# Patient Record
Sex: MALE | Race: BLACK OR AFRICAN AMERICAN | NOT HISPANIC OR LATINO | Employment: FULL TIME | URBAN - METROPOLITAN AREA
[De-identification: names, ages, dates, MRNs, and addresses within clinical notes are randomized per-mention and may not be internally consistent; named-entity substitution may affect disease eponyms.]

---

## 2018-07-24 ENCOUNTER — DOCUMENTATION ONLY (OUTPATIENT)
Dept: TRANSPLANT | Facility: CLINIC | Age: 38
End: 2018-07-24

## 2018-07-24 NOTE — PROGRESS NOTES
Pt last seen by [facility] in 2016.  preht file closed.  He may be referred again if needed.

## 2018-07-26 NOTE — NURSING
Patient removed from VAD Potential membership list due to no follow up since 2016. VAD episode closed. Potential Folder Discarded and all signed paperwork scanned into EMR. Will initiate VAD education when or if indicated by provider team.

## 2018-12-14 ENCOUNTER — TELEPHONE (OUTPATIENT)
Dept: TRANSPLANT | Facility: CLINIC | Age: 38
End: 2018-12-14

## 2018-12-14 DIAGNOSIS — I50.9 CONGESTIVE HEART FAILURE, UNSPECIFIED HF CHRONICITY, UNSPECIFIED HEART FAILURE TYPE: Primary | ICD-10-CM

## 2018-12-14 NOTE — TELEPHONE ENCOUNTER
Called to schedule consult appointment. No answer after multiple rings. Called Dr Richards's office to request copy of echo. Office closed

## 2018-12-18 NOTE — TELEPHONE ENCOUNTER
REFERRAL NOTE:    Trell Landaverde has been referred to the pre-heart transplant office for consideration for orthotopic heart transplantation by Germán Richards. As per patients request to come in the second week after the first of the year, patient's appointments have been scheduled for 01/11/19. Information was provided and questions were answered. AHF/ Transplant Handout, appointment letter and campus map was mailed to the patient. My contact information was given. Pleasant. Call PRN. Left message on nurse voicemail at Dr Richards's office ([phone]) with appointment information along with my contact information.

## 2018-12-18 NOTE — TELEPHONE ENCOUNTER
Records received from referring provider scanned into [employer] and sent to be scanned into Epic.

## 2019-01-11 ENCOUNTER — INITIAL CONSULT (OUTPATIENT)
Dept: TRANSPLANT | Facility: CLINIC | Age: 39
End: 2019-01-11
Payer: MEDICARE

## 2019-01-11 ENCOUNTER — HOSPITAL ENCOUNTER (OUTPATIENT)
Dept: PULMONOLOGY | Facility: CLINIC | Age: 39
Discharge: HOME | End: 2019-01-11
Payer: MEDICARE

## 2019-01-11 ENCOUNTER — DOCUMENTATION ONLY (OUTPATIENT)
Dept: TRANSPLANT | Facility: CLINIC | Age: 39
End: 2019-01-11

## 2019-01-11 ENCOUNTER — HOSPITAL ENCOUNTER (OUTPATIENT)
Dept: CARDIOLOGY | Facility: CLINIC | Age: 39
Discharge: HOME | End: 2019-01-11
Attending: INTERNAL MEDICINE
Payer: MEDICARE

## 2019-01-11 VITALS
SYSTOLIC BLOOD PRESSURE: 112 MMHG | DIASTOLIC BLOOD PRESSURE: 64 MMHG | WEIGHT: 315 LBS | DIASTOLIC BLOOD PRESSURE: 59 MMHG | HEART RATE: 80 BPM | HEIGHT: 69 IN | WEIGHT: 315 LBS | HEART RATE: 79 BPM | HEIGHT: 69 IN | OXYGEN SATURATION: 96 % | BODY MASS INDEX: 46.65 KG/M2 | BODY MASS INDEX: 46.65 KG/M2 | SYSTOLIC BLOOD PRESSURE: 112 MMHG

## 2019-01-11 VITALS — WEIGHT: 315 LBS | BODY MASS INDEX: 46.65 KG/M2 | HEIGHT: 69 IN

## 2019-01-11 DIAGNOSIS — I42.0 DILATED CARDIOMYOPATHY: ICD-10-CM

## 2019-01-11 DIAGNOSIS — G47.33 OBSTRUCTIVE SLEEP APNEA SYNDROME: ICD-10-CM

## 2019-01-11 DIAGNOSIS — I50.9 CONGESTIVE HEART FAILURE, UNSPECIFIED HF CHRONICITY, UNSPECIFIED HEART FAILURE TYPE: ICD-10-CM

## 2019-01-11 DIAGNOSIS — E66.9 NON MORBID OBESITY: ICD-10-CM

## 2019-01-11 DIAGNOSIS — Z95.810 S/P ICD (INTERNAL CARDIAC DEFIBRILLATOR) PROCEDURE: ICD-10-CM

## 2019-01-11 DIAGNOSIS — I10 ESSENTIAL HYPERTENSION: ICD-10-CM

## 2019-01-11 DIAGNOSIS — I50.40 COMBINED SYSTOLIC AND DIASTOLIC CONGESTIVE HEART FAILURE, NYHA CLASS 3, UNSPECIFIED CONGESTIVE HEART FAILURE CHRONICITY: Primary | ICD-10-CM

## 2019-01-11 LAB
ASCENDING AORTA: 2.92 CM
AV INDEX (PROSTH): 0.57
AV MEAN GRADIENT: 3.02 MMHG
AV PEAK GRADIENT: 5.02 MMHG
AV VALVE AREA: 2.11 CM2
BSA FOR ECHO PROCEDURE: 2.87 M2
CV ECHO LV RWT: 0.2 CM
DOP CALC AO PEAK VEL: 1.12 M/S
DOP CALC AO VTI: 19.84 CM
DOP CALC LVOT AREA: 3.7 CM2
DOP CALC LVOT DIAMETER: 2.17 CM
DOP CALC LVOT STROKE VOLUME: 41.88 CM3
DOP CALCLVOT PEAK VEL VTI: 11.33 CM
E WAVE DECELERATION TIME: 249.91 MSEC
E/A RATIO: 0.51
E/E' RATIO: 6.57
ECHO LV POSTERIOR WALL: 0.68 CM (ref 0.6–1.1)
FRACTIONAL SHORTENING: 7 % (ref 28–44)
INTERVENTRICULAR SEPTUM: 0.75 CM (ref 0.6–1.1)
IVRT: 0.12 MSEC
LA MAJOR: 6.12 CM
LA MINOR: 6.05 CM
LA WIDTH: 4.95 CM
LEFT ATRIUM SIZE: 4.86 CM
LEFT ATRIUM VOLUME INDEX: 46.3 ML/M2
LEFT ATRIUM VOLUME: 124.42 CM3
LEFT INTERNAL DIMENSION IN SYSTOLE: 6.36 CM (ref 2.1–4)
LEFT VENTRICLE DIASTOLIC VOLUME INDEX: 90.56 ML/M2
LEFT VENTRICLE DIASTOLIC VOLUME: 243.51 ML
LEFT VENTRICLE MASS INDEX: 76.4 G/M2
LEFT VENTRICLE SYSTOLIC VOLUME INDEX: 76.6 ML/M2
LEFT VENTRICLE SYSTOLIC VOLUME: 205.9 ML
LEFT VENTRICULAR INTERNAL DIMENSION IN DIASTOLE: 6.85 CM (ref 3.5–6)
LEFT VENTRICULAR MASS: 205.48 G
LV LATERAL E/E' RATIO: 5.75
LV SEPTAL E/E' RATIO: 7.67
MV PEAK A VEL: 0.91 M/S
MV PEAK E VEL: 0.46 M/S
PISA TR MAX VEL: 2.71 M/S
PULM VEIN S/D RATIO: 0.7
PV PEAK D VEL: 0.57 M/S
PV PEAK S VEL: 0.4 M/S
PV PEAK VELOCITY: 0.85 CM/S
RA MAJOR: 5.42 CM
RA PRESSURE: 3 MMHG
RA WIDTH: 4.95 CM
RIGHT VENTRICULAR END-DIASTOLIC DIMENSION: 3.25 CM
RV TISSUE DOPPLER FREE WALL SYSTOLIC VELOCITY 1 (APICAL 4 CHAMBER VIEW): 11.49 M/S
SINUS: 3.16 CM
STJ: 2.92 CM
TDI LATERAL: 0.08
TDI SEPTAL: 0.06
TDI: 0.07
TR MAX PG: 29.38 MMHG
TRICUSPID ANNULAR PLANE SYSTOLIC EXCURSION: 1.69 CM
TV REST PULMONARY ARTERY PRESSURE: 32 MMHG

## 2019-01-11 PROCEDURE — 99999 PR PBB SHADOW E&M-EST. PATIENT-LVL IV: ICD-10-PCS | Mod: PBBFAC,TXP,, | Performed by: INTERNAL MEDICINE

## 2019-01-11 PROCEDURE — 94618 PULMONARY STRESS TESTING: ICD-10-PCS | Mod: NTX,S$GLB,, | Performed by: INTERNAL MEDICINE

## 2019-01-11 PROCEDURE — 3074F PR MOST RECENT SYSTOLIC BLOOD PRESSURE < 130 MM HG: ICD-10-PCS | Mod: CPTII,S$GLB,TXP, | Performed by: INTERNAL MEDICINE

## 2019-01-11 PROCEDURE — 3008F PR BODY MASS INDEX (BMI) DOCUMENTED: ICD-10-PCS | Mod: CPTII,S$GLB,TXP, | Performed by: INTERNAL MEDICINE

## 2019-01-11 PROCEDURE — 3078F DIAST BP <80 MM HG: CPT | Mod: CPTII,S$GLB,TXP, | Performed by: INTERNAL MEDICINE

## 2019-01-11 PROCEDURE — 94618 PULMONARY STRESS TESTING: CPT | Mod: NTX,S$GLB,, | Performed by: INTERNAL MEDICINE

## 2019-01-11 PROCEDURE — 99999 PR PBB SHADOW E&M-EST. PATIENT-LVL IV: CPT | Mod: PBBFAC,TXP,, | Performed by: INTERNAL MEDICINE

## 2019-01-11 PROCEDURE — 93306 TTE W/DOPPLER COMPLETE: CPT | Mod: NTX,S$GLB,, | Performed by: INTERNAL MEDICINE

## 2019-01-11 PROCEDURE — 3008F BODY MASS INDEX DOCD: CPT | Mod: CPTII,S$GLB,TXP, | Performed by: INTERNAL MEDICINE

## 2019-01-11 PROCEDURE — 93306 TRANSTHORACIC ECHO (TTE) COMPLETE (CUPID ONLY): ICD-10-PCS | Mod: NTX,S$GLB,, | Performed by: INTERNAL MEDICINE

## 2019-01-11 PROCEDURE — 3078F PR MOST RECENT DIASTOLIC BLOOD PRESSURE < 80 MM HG: ICD-10-PCS | Mod: CPTII,S$GLB,TXP, | Performed by: INTERNAL MEDICINE

## 2019-01-11 PROCEDURE — 99215 PR OFFICE/OUTPT VISIT, EST, LEVL V, 40-54 MIN: ICD-10-PCS | Mod: S$GLB,TXP,, | Performed by: INTERNAL MEDICINE

## 2019-01-11 PROCEDURE — 3074F SYST BP LT 130 MM HG: CPT | Mod: CPTII,S$GLB,TXP, | Performed by: INTERNAL MEDICINE

## 2019-01-11 PROCEDURE — 99215 OFFICE O/P EST HI 40 MIN: CPT | Mod: S$GLB,TXP,, | Performed by: INTERNAL MEDICINE

## 2019-01-11 RX ORDER — SPIRONOLACTONE 25 MG/1
25 TABLET ORAL DAILY
COMMUNITY
End: 2021-02-08

## 2019-01-11 RX ORDER — ZOLPIDEM TARTRATE 10 MG/1
10 TABLET ORAL NIGHTLY PRN
COMMUNITY
Start: 2018-12-11

## 2019-01-11 RX ORDER — TAMSULOSIN HYDROCHLORIDE 0.4 MG/1
0.4 CAPSULE ORAL
COMMUNITY
Start: 2018-10-17 | End: 2022-06-24 | Stop reason: SDUPTHER

## 2019-01-11 RX ORDER — AMIODARONE HYDROCHLORIDE 200 MG/1
TABLET ORAL
COMMUNITY
Start: 2017-08-21 | End: 2019-09-11 | Stop reason: ALTCHOICE

## 2019-01-11 NOTE — PROGRESS NOTES
Chart reviewed.  Pt will not undergo full eval for AHF options at this time.  He is scheduled for CPX on 4/11/19.  Request placed for provider f/u on same day following testing.

## 2019-01-11 NOTE — NURSING
Patient identified by 2 identifiers. Denies previous reactions to blood transfusions and allergies reviewed.  Procedure explained & consent obtained.  22 g IV placed to Rt Hand, flushed w/ 10cc NS pre & post contrast administration.  Pt was a challenging IV stick, pt stuck 4 times by 2 nurses.  3cc Optison administered, echo images obtained.  Pt tolerated procedure well.  IV D/C'ed, preasure dsg applied.  Pt D/C'ed to home.

## 2019-01-11 NOTE — LETTER
January 11, 2019        Germán Richards  [address]  [place] [address]  Phone: [phone]  Fax: [phone]             [facility] [address] [place] [address]  Phone: [phone]   Patient: Trell Landaverde   MR Number: [id number]   YOB: 1980   Date of Visit: 1/11/2019       Dear Dr. Germán Richards    Thank you for referring Trell Landaverde to me for evaluation. Attached you will find relevant portions of my assessment and plan of care.    If you have questions, please do not hesitate to call me. I look forward to following Trell Landaverde along with you.    Sincerely,    Israel Mariee MD    Enclosure    If you would like to receive this communication electronically, please contact [email or web address] or [phone] to request [employer] Link access.    [employer] Link is a tool which provides read-only access to select patient information with whom you have a relationship. Its easy to use and provides real time access to review your patients record including encounter summaries, notes, results, and demographic information.    If you feel you have received this communication in error or would no longer like to receive these types of communications, please e-mail [email or web address]

## 2019-01-12 NOTE — PROCEDURES
Trell Landaverde is a 38 y.o.  male patient, who presents for a 6 minute walk test ordered by Israel Mariee MD.  The diagnosis is Pre Heart Transplant; Congestive Heart Failure.  The patient's BMI is 55 kg/m2.  Predicted distance (lower limit of normal) is 421.67 meters.      Test Results:    The test was completed without stopping.  The total time walked was 360 seconds.  During walking, the patient reported:  No complaints.  The patient used no assistive devices during testing.     01/11/2019---------Distance: 365.76 meters (1200 feet)     O2 Sat % Supplemental Oxygen Heart Rate Blood Pressure Elayne Scale   Pre-exercise  (Resting) 97 % Room Air 76 bpm 144/73 mmHg 0   During Exercise 93 % Room Air 104 bpm 142/78 mmHg 1   Post-exercise  (Recovery) 95 % Room Air  97 bpm       Recovery Time:  52 seconds    Performing nurse/tech:  Eun SHIRLEY      PREVIOUS STUDY:   The patient has not had a previous study.      CLINICAL INTERPRETATION:  Six minute walk distance is 365.76 meters (1200 feet) with very light dyspnea.  During exercise, there was desaturation while breathing room air.  Blood pressure remained stable and Heart rate increased significantly with walking.  The patient did not report non-pulmonary symptoms during exercise.  No previous study performed.  Based upon age and body mass index, exercise capacity is less than predicted.

## 2019-04-10 ENCOUNTER — TELEPHONE (OUTPATIENT)
Dept: TRANSPLANT | Facility: CLINIC | Age: 39
End: 2019-04-10

## 2019-04-10 DIAGNOSIS — I50.40 COMBINED SYSTOLIC AND DIASTOLIC CONGESTIVE HEART FAILURE, NYHA CLASS 3, UNSPECIFIED CONGESTIVE HEART FAILURE CHRONICITY: Primary | ICD-10-CM

## 2019-04-10 NOTE — TELEPHONE ENCOUNTER
Spoke with pt to confirm appointments for tomorrow (04/11/2019). Informed pt that labs had been added to appointment schedule for 7:50. Instructed pt to report to 2nd floor clinic tower for labs and that CPX check in would be in that same area. Pt verbalized understanding.

## 2019-04-11 ENCOUNTER — HOSPITAL ENCOUNTER (OUTPATIENT)
Dept: CARDIOLOGY | Facility: CLINIC | Age: 39
Discharge: HOME | End: 2019-04-11
Attending: INTERNAL MEDICINE
Payer: MEDICARE

## 2019-04-11 ENCOUNTER — OFFICE VISIT (OUTPATIENT)
Dept: TRANSPLANT | Facility: CLINIC | Age: 39
End: 2019-04-11
Payer: MEDICARE

## 2019-04-11 VITALS
WEIGHT: 315 LBS | SYSTOLIC BLOOD PRESSURE: 130 MMHG | WEIGHT: 315 LBS | DIASTOLIC BLOOD PRESSURE: 89 MMHG | HEART RATE: 71 BPM | SYSTOLIC BLOOD PRESSURE: 125 MMHG | HEIGHT: 69 IN | BODY MASS INDEX: 46.65 KG/M2 | DIASTOLIC BLOOD PRESSURE: 58 MMHG | BODY MASS INDEX: 46.65 KG/M2 | HEIGHT: 69 IN | HEART RATE: 83 BPM

## 2019-04-11 DIAGNOSIS — G47.33 OBSTRUCTIVE SLEEP APNEA SYNDROME: ICD-10-CM

## 2019-04-11 DIAGNOSIS — I10 ESSENTIAL HYPERTENSION: ICD-10-CM

## 2019-04-11 DIAGNOSIS — Z95.810 S/P ICD (INTERNAL CARDIAC DEFIBRILLATOR) PROCEDURE: ICD-10-CM

## 2019-04-11 DIAGNOSIS — I50.22 CHRONIC SYSTOLIC CONGESTIVE HEART FAILURE: Primary | ICD-10-CM

## 2019-04-11 DIAGNOSIS — E66.01 CLASS 3 SEVERE OBESITY WITHOUT SERIOUS COMORBIDITY WITH BODY MASS INDEX (BMI) OF 60.0 TO 69.9 IN ADULT, UNSPECIFIED OBESITY TYPE: ICD-10-CM

## 2019-04-11 DIAGNOSIS — I42.0 DILATED CARDIOMYOPATHY: ICD-10-CM

## 2019-04-11 DIAGNOSIS — I50.40 COMBINED SYSTOLIC AND DIASTOLIC CONGESTIVE HEART FAILURE, NYHA CLASS 3, UNSPECIFIED CONGESTIVE HEART FAILURE CHRONICITY: ICD-10-CM

## 2019-04-11 LAB
CV STRESS BASE HR: 71 BPM
DIASTOLIC BLOOD PRESSURE: 89 MMHG
OHS CV CPX 1 MINUTE RECOVERY HEART RATE: 122 BPM
OHS CV CPX 85 PERCENT MAX PREDICTED HEART RATE MALE: 156
OHS CV CPX ANAEROBIC THRESHOLD DIASTOLIC BLOOD PRESSURE: 75 MMHG
OHS CV CPX ANAEROBIC THRESHOLD HEART RATE: 106
OHS CV CPX ANAEROBIC THRESHOLD RATE PRESSURE PRODUCT: NORMAL
OHS CV CPX ANAEROBIC THRESHOLD SYSTOLIC BLOOD PRESSURE: 145
OHS CV CPX DATA GRADE - AT: 2.1
OHS CV CPX DATA GRADE - PEAK: 4.7
OHS CV CPX DATA O2 SAT - PEAK: 92
OHS CV CPX DATA O2 SAT - REST: 95
OHS CV CPX DATA SPEED - AT: 2.1
OHS CV CPX DATA SPEED - PEAK: 2.8
OHS CV CPX DATA TIME - AT: 4.63
OHS CV CPX DATA TIME - PEAK: 7.21
OHS CV CPX DATA VE/VCO2 - AT: 27
OHS CV CPX DATA VE/VCO2 - PEAK: 30
OHS CV CPX DATA VE/VO2 - AT: 22
OHS CV CPX DATA VE/VO2 - PEAK: 33
OHS CV CPX DATA VO2 - AT: 10.1
OHS CV CPX DATA VO2 - PEAK: 13.5
OHS CV CPX DATA VO2 - REST: 2.8
OHS CV CPX FEV1/FVC: 0.81
OHS CV CPX FORCED EXPIRATORY VOLUME: 2.83
OHS CV CPX FORCED VITAL CAPACITY (FVC): 3.5
OHS CV CPX HIGHEST VO: 22
OHS CV CPX MAX PREDICTED HEART RATE: 183
OHS CV CPX MAXIMAL VOLUNTARY VENTILATION (MVV) PREDICTED: 113.2
OHS CV CPX MAXIMAL VOLUNTARY VENTILATION (MVV): 75
OHS CV CPX MAXIUMUM EXERCISE VENTILATION (VE MAX): 75.4
OHS CV CPX PATIENT AGE: 37
OHS CV CPX PATIENT HEIGHT IN: 69
OHS CV CPX PATIENT IS FEMALE AGE 11-19: 0
OHS CV CPX PATIENT IS FEMALE AGE GREATER THAN 19: 0
OHS CV CPX PATIENT IS FEMALE AGE LESS THAN 11: 0
OHS CV CPX PATIENT IS FEMALE: 0
OHS CV CPX PATIENT IS MALE AGE 11-25: 0
OHS CV CPX PATIENT IS MALE AGE GREATER THAN 25: 1
OHS CV CPX PATIENT IS MALE AGE LESS THAN 11: 0
OHS CV CPX PATIENT IS MALE GREATER THAN 18: 1
OHS CV CPX PATIENT IS MALE LESS THAN OR EQUAL TO 18: 0
OHS CV CPX PATIENT IS MALE: 1
OHS CV CPX PATIENT WEIGHT RETURNED IN OZ: 6048
OHS CV CPX PEAK DIASTOLIC BLOOD PRESSURE: 99 MMHG
OHS CV CPX PEAK HEAR RATE: 126 BPM
OHS CV CPX PEAK RATE PRESSURE PRODUCT: NORMAL
OHS CV CPX PEAK SYSTOLIC BLOOD PRESSURE: 165 MMHG
OHS CV CPX PERCENT BODY FAT: 34.8
OHS CV CPX PERCENT MAX PREDICTED HEART RATE ACHIEVED: 69
OHS CV CPX PREDICTED VO2: 22 ML/KG/MIN
OHS CV CPX RATE PRESSURE PRODUCT PRESENTING: 9230
OHS CV CPX REST PET CO2: 38
OHS CV CPX VE/VCO2 SLOPE: 24.9
STRESS ECHO POST EXERCISE DUR MIN: 7 MIN
STRESS ECHO POST EXERCISE DUR SEC: 13
SYSTOLIC BLOOD PRESSURE: 130 MMHG

## 2019-04-11 PROCEDURE — 3074F SYST BP LT 130 MM HG: CPT | Mod: CPTII,NTX,S$GLB, | Performed by: INTERNAL MEDICINE

## 2019-04-11 PROCEDURE — 99999 PR PBB SHADOW E&M-EST. PATIENT-LVL III: CPT | Mod: PBBFAC,TXP,, | Performed by: INTERNAL MEDICINE

## 2019-04-11 PROCEDURE — 94621 CARDIOPULM EXERCISE TESTING: CPT | Mod: NTX,S$GLB,, | Performed by: INTERNAL MEDICINE

## 2019-04-11 PROCEDURE — 99215 PR OFFICE/OUTPT VISIT, EST, LEVL V, 40-54 MIN: ICD-10-PCS | Mod: NTX,S$GLB,, | Performed by: INTERNAL MEDICINE

## 2019-04-11 PROCEDURE — 94621 CARDIOPULMONARY EXERCISE TESTING (CUPID ONLY): ICD-10-PCS | Mod: NTX,S$GLB,, | Performed by: INTERNAL MEDICINE

## 2019-04-11 PROCEDURE — 99215 OFFICE O/P EST HI 40 MIN: CPT | Mod: NTX,S$GLB,, | Performed by: INTERNAL MEDICINE

## 2019-04-11 PROCEDURE — 3074F PR MOST RECENT SYSTOLIC BLOOD PRESSURE < 130 MM HG: ICD-10-PCS | Mod: CPTII,NTX,S$GLB, | Performed by: INTERNAL MEDICINE

## 2019-04-11 PROCEDURE — 3078F DIAST BP <80 MM HG: CPT | Mod: CPTII,NTX,S$GLB, | Performed by: INTERNAL MEDICINE

## 2019-04-11 PROCEDURE — 3078F PR MOST RECENT DIASTOLIC BLOOD PRESSURE < 80 MM HG: ICD-10-PCS | Mod: CPTII,NTX,S$GLB, | Performed by: INTERNAL MEDICINE

## 2019-04-11 PROCEDURE — 99999 PR PBB SHADOW E&M-EST. PATIENT-LVL III: ICD-10-PCS | Mod: PBBFAC,TXP,, | Performed by: INTERNAL MEDICINE

## 2019-04-11 PROCEDURE — 3008F PR BODY MASS INDEX (BMI) DOCUMENTED: ICD-10-PCS | Mod: CPTII,NTX,S$GLB, | Performed by: INTERNAL MEDICINE

## 2019-04-11 PROCEDURE — 3008F BODY MASS INDEX DOCD: CPT | Mod: CPTII,NTX,S$GLB, | Performed by: INTERNAL MEDICINE

## 2019-04-11 NOTE — LETTER
April 11, 2019        Germán Richards  [address]  [place] [address]  Phone: [phone]  Fax: [phone]             [facility] [address] [place] [address]  Phone: [phone]   Patient: Trell Landaverde   MR Number: [id number]   YOB: 1980   Date of Visit: 4/11/2019       Dear Dr. Germán Richards    Thank you for referring Trell Landaverde to me for evaluation. Attached you will find relevant portions of my assessment and plan of care.    If you have questions, please do not hesitate to call me. I look forward to following Trell Landaverde along with you.    Sincerely,    Israel Mariee MD    Enclosure    If you would like to receive this communication electronically, please contact [email or web address] or [phone] to request [employer] Link access.    [employer] Link is a tool which provides read-only access to select patient information with whom you have a relationship. Its easy to use and provides real time access to review your patients record including encounter summaries, notes, results, and demographic information.    If you feel you have received this communication in error or would no longer like to receive these types of communications, please e-mail [email or web address]

## 2019-05-08 ENCOUNTER — HOSPITAL ENCOUNTER (OUTPATIENT)
Dept: RADIOLOGY | Facility: HOSPITAL | Age: 39
Discharge: HOME | End: 2019-05-08
Attending: PHYSICIAN ASSISTANT
Payer: MEDICARE

## 2019-05-08 ENCOUNTER — INITIAL CONSULT (OUTPATIENT)
Dept: BARIATRICS | Facility: CLINIC | Age: 39
End: 2019-05-08
Payer: MEDICARE

## 2019-05-08 VITALS — BODY MASS INDEX: 46.65 KG/M2 | WEIGHT: 315 LBS | HEIGHT: 69 IN

## 2019-05-08 DIAGNOSIS — I10 BENIGN ESSENTIAL HYPERTENSION: ICD-10-CM

## 2019-05-08 DIAGNOSIS — Z95.810 S/P ICD (INTERNAL CARDIAC DEFIBRILLATOR) PROCEDURE: ICD-10-CM

## 2019-05-08 DIAGNOSIS — I42.0 NONISCHEMIC DILATED CARDIOMYOPATHY: ICD-10-CM

## 2019-05-08 DIAGNOSIS — D51.9 ANEMIA DUE TO VITAMIN B12 DEFICIENCY, UNSPECIFIED B12 DEFICIENCY TYPE: ICD-10-CM

## 2019-05-08 DIAGNOSIS — G47.33 OSA (OBSTRUCTIVE SLEEP APNEA): ICD-10-CM

## 2019-05-08 DIAGNOSIS — I50.40 COMBINED SYSTOLIC AND DIASTOLIC CONGESTIVE HEART FAILURE, NYHA CLASS 3, UNSPECIFIED CONGESTIVE HEART FAILURE CHRONICITY: ICD-10-CM

## 2019-05-08 DIAGNOSIS — D52.9 ANEMIA DUE TO FOLIC ACID DEFICIENCY, UNSPECIFIED DEFICIENCY TYPE: ICD-10-CM

## 2019-05-08 DIAGNOSIS — Z95.810 IMPLANTABLE CARDIOVERTER-DEFIBRILLATOR (ICD) IN SITU: ICD-10-CM

## 2019-05-08 DIAGNOSIS — R73.01 IMPAIRED FASTING GLUCOSE: ICD-10-CM

## 2019-05-08 DIAGNOSIS — F32.A DEPRESSION, UNSPECIFIED DEPRESSION TYPE: ICD-10-CM

## 2019-05-08 DIAGNOSIS — I50.22 CHRONIC SYSTOLIC CONGESTIVE HEART FAILURE: ICD-10-CM

## 2019-05-08 PROCEDURE — 71046 XR CHEST PA AND LATERAL: ICD-10-PCS | Mod: 26,NTX,, | Performed by: RADIOLOGY

## 2019-05-08 PROCEDURE — 3008F BODY MASS INDEX DOCD: CPT | Mod: CPTII,S$GLB,TXP, | Performed by: PHYSICIAN ASSISTANT

## 2019-05-08 PROCEDURE — 3008F PR BODY MASS INDEX (BMI) DOCUMENTED: ICD-10-PCS | Mod: CPTII,S$GLB,TXP, | Performed by: PHYSICIAN ASSISTANT

## 2019-05-08 PROCEDURE — 71046 X-RAY EXAM CHEST 2 VIEWS: CPT | Mod: TC,NTX

## 2019-05-08 PROCEDURE — 99999 PR PBB SHADOW E&M-EST. PATIENT-LVL III: CPT | Mod: PBBFAC,TXP,, | Performed by: PHYSICIAN ASSISTANT

## 2019-05-08 PROCEDURE — 71046 X-RAY EXAM CHEST 2 VIEWS: CPT | Mod: 26,NTX,, | Performed by: RADIOLOGY

## 2019-05-08 PROCEDURE — 99205 PR OFFICE/OUTPT VISIT, NEW, LEVL V, 60-74 MIN: ICD-10-PCS | Mod: S$GLB,TXP,, | Performed by: PHYSICIAN ASSISTANT

## 2019-05-08 PROCEDURE — 99205 OFFICE O/P NEW HI 60 MIN: CPT | Mod: S$GLB,TXP,, | Performed by: PHYSICIAN ASSISTANT

## 2019-05-08 PROCEDURE — 99999 PR PBB SHADOW E&M-EST. PATIENT-LVL III: ICD-10-PCS | Mod: PBBFAC,TXP,, | Performed by: PHYSICIAN ASSISTANT

## 2019-05-08 NOTE — PATIENT INSTRUCTIONS
Prior to surgery you will need to complete:  - Dietitian consult and follow up appointments as needed  - Heart failure clearance  - Labs  - Chest X-ray  - EKG  - Psychological evaluation, Please call psychiatry [phone] to schedule      In preparation for bariatric surgery, please complete the following:   · Discuss your current medications with your primary care provider, remember your medications will need to be crushed, chewable, or in liquid form for the first 3-6 months after a gastric bypass or sleeve.  For a gastric band, your medications will need to be crushed indefinitely.    · If you take a blood thinner such as: Coumadin (warfarin), Pradaxa (dabigatran), or Plavix (clopidogrel), you will need to speak with your prescribing provider on how or if this medication can be stopped before surgery.   · If you take a medication for depression or anxiety, you will need to begin crushing or opening the capsule 1-3 months prior to surgery.  Remember to discuss this with the psychologist or psychiatrist that you see.   · If you take medication for arthritis on a daily basis that is considered a non-steroidal anti-inflammatory (NSAID), please discuss with your prescribing physician an alternative medication.  After having gastric bypass or gastric sleeve, this group of medications is not appropriate to take due to increased risk of bleeding stomach ulcers.      DEFINITIONS  Appointments: Pre-scheduled meetings or consultations with any physician, advanced practice provider, dietitian, or psychologist, and labs, imaging studies, sleep studies, etc.   Late cancellation: Cancelling an appointment 24-48 hours prior to scheduled time.  No-Show appointment:  is when    You do NOT arrive to your appointment at the time its scheduled.   You call to cancel or cancel via [facility] less than 24 hours in advance of your scheduled appointment   You show up 15 minutes AFTER your scheduled appointment time without any  notification of being late.     POLICY  1. You are allowed up to 3 cancellations for appointments.    After 3 cancellations your case will be placed on hold for 2 months and after that time you can resume the program.   2. You are allowed only 1 no-show for an appointment.    You will be re-scheduled one time and if there is a 2nd no-show at any point, your case will be placed on hold for 3 months.  After 3 months you can resume the program.     3. Upon resuming the program after being placed on hold for either above mentioned reasons, if you have a late cancel or no show for any appointment, the bariatric team will review if youre an appropriate candidate for surgery at the monthly meeting.

## 2019-05-08 NOTE — PROGRESS NOTES
BARIATRIC NEW PATIENT EVALUATION    CHIEF COMPLAINT:   Morbid Obesity Body mass index is 55.12 kg/m². and inability to lose wieght and maintain weight loss .    HISTORY OF PRESENT ILLNESS:  Trell Landaverde  is a 38 y.o. male presenting for morbid obesity Body mass index is 55.12 kg/m². and inability to lose weight and maintain weight loss . Pt states he has not always been overweight, states around the age of 26 he started to gain weight. In the past twelve years has gained over 100 lbs about 150. The patient has tried exercising 3x's, and Weight Watchers, vegan diet . Highest weight 383. 250-260.    Echo January 2019     · Moderate left ventricular enlargement.  · Severely decreased left ventricular systolic function. The estimated ejection fraction is 20%  · Severe global hypokinetic wall motion.  · Grade I (mild) left ventricular diastolic dysfunction consistent with impaired relaxation.  · Moderate left atrial enlargement.  · Low normal right ventricular systolic function.  · Mild right atrial enlargement.  · Mild mitral regurgitation.  · Mild tricuspid regurgitation.  · Normal central venous pressure (3 mm Hg).  · The estimated PA systolic pressure is 32 mm Hg        2D echo with CFD  - Eccentric hypertrophy.   - Severely depressed left ventricular systolic function (EF 15-20%).   - Moderate left atrial enlargement.   - Biatrial enlargement.   - Normal right ventricular systolic function .   - The estimated PA systolic pressure is greater than 23 mmHg.   - Mild tricuspid regurgitation.   - Atrial septal aneurysm .      [facility]  AOPRES: 132/90 (104)  AOSAT: 97  FICKCI: 2.25  FICKCO: 5.62  PAPRES: 45/24 (31)  PASAT: 69  PVR: 2.31  PWPRES: 25/18 (18)  RAPRES: 10/8 (7)  RVPRES: 44/7, 7  SVR: 17.26  PWSAT: 95     CPX   - The respiratory exchange ratio (RER) was 1.13, suggesting an adequate effort.  - The breathing reserve is calculated at 33%, which is normal. Oxygen saturation with exercise remained normal.   - The  PkVO2 was 19.2 ml/kg/min which is 47% of predicted equating to a functional capacity of 5.5 METS indicating severe functional impairment.   - The anaerobic threshold (AT), which occurred at a heart rate of 125bpm, was 12.8 ml/kg/min, which is 31% of the predicted VO2 and is reduced.   - The PkVO2 Lean was 27.24 ml/kg of lean body weight/min indicating a good prognosis in heart failure.   - The VE/VCO2 decreased by -31% from rest to AT. The VE/VCO2 Brantley was 22.1.  The Resting PetCO2 was 43.7.            PAST MEDICAL HISTORY:  Past Medical History:   Diagnosis Date    Allergy     Cardiomyopathy     CHF (congestive heart failure)     Depression     Hypertension     Obesity          PAST SURGICAL HISTORY:  Past Surgical History:   Procedure Laterality Date    defibrillator      FRACTURE SURGERY Left     ankle    VASECTOMY         FAMILY HISTORY:  Family History   Problem Relation Age of Onset    Hypertension Mother     Hypertension Brother        SOCIAL HISTORY:  Social History     Socioeconomic History    Marital status:      Spouse name: Not on file    Number of children: Not on file    Years of education: Not on file    Highest education level: Not on file   Occupational History    Not on file   Social Needs    Financial resource strain: Not on file    Food insecurity:     Worry: Not on file     Inability: Not on file    Transportation needs:     Medical: Not on file     Non-medical: Not on file   Tobacco Use    Smoking status: Never Smoker    Smokeless tobacco: Never Used   Substance and Sexual Activity    Alcohol use: Yes    Drug use: No    Sexual activity: Yes     Partners: Female     Birth control/protection: Partner-Vasectomy   Lifestyle    Physical activity:     Days per week: Not on file     Minutes per session: Not on file    Stress: Not on file   Relationships    Social connections:     Talks on phone: Not on file     Gets together: Not on file     Attends Confucianist  service: Not on file     Active member of club or organization: Not on file     Attends meetings of clubs or organizations: Not on file     Relationship status: Not on file   Other Topics Concern    Not on file   Social History Narrative    Not on file       MEDICATIONS:  Current Outpatient Medications   Medication Sig Dispense Refill    amiodarone (PACERONE) 200 MG Tab TAKE ONE TABLET BY MOUTH ONCE DAILY      aspirin (ECOTRIN) 81 MG EC tablet Take 81 mg by mouth once daily.      carvedilol (COREG) 25 MG tablet Take 1 tablet (25 mg total) by mouth 2 (two) times daily. (Patient taking differently: Take 25 mg by mouth 2 (two) times daily. Take a tablet and a 1/2 twice a day) 90 tablet 12    furosemide (LASIX) 40 MG tablet Take by mouth 2 (two) times daily. Take a tablet and 1/2 twice a day      sacubitril-valsartan (ENTRESTO) 49-51 mg Tab Take 1 tablet by mouth 2 (two) times daily.       spironolactone (ALDACTONE) 25 MG tablet Take 25 mg by mouth once daily.       tamsulosin (FLOMAX) 0.4 mg Cap Take 0.4 mg by mouth once daily.       zolpidem (AMBIEN) 10 mg Tab Take 10 mg by mouth nightly as needed.       albuterol (ACCUNEB) 0.63 mg/3 mL Nebu Take 0.63 mg by nebulization every 6 (six) hours as needed.      fluticasone-salmeterol 100-50 mcg/dose (ADVAIR) 100-50 mcg/dose diskus inhaler Inhale 1 puff into the lungs as needed.        No current facility-administered medications for this visit.        ALLERGIES:  Review of patient's allergies indicates:   Allergen Reactions    Iodine Swelling    Latex Itching    Orange Swelling    Orange juice Swelling    Orange oil Swelling    Shellfish containing products Swelling       ROS:  Review of Systems   Constitutional: Negative for chills and fever.   HENT: Negative for tinnitus.    Eyes: Negative for blurred vision and double vision.   Respiratory: Positive for shortness of breath (intermittent depending on activity ). Negative for cough.    Cardiovascular:  "Negative for chest pain, palpitations, orthopnea and leg swelling.   Gastrointestinal: Negative for abdominal pain, constipation, diarrhea, heartburn, nausea and vomiting.   Genitourinary: Negative for dysuria and hematuria.   Musculoskeletal: Negative for back pain, joint pain, myalgias and neck pain.   Skin: Negative for rash.   Neurological: Negative for dizziness, tingling and headaches.   Psychiatric/Behavioral: Negative for depression and suicidal ideas. The patient is not nervous/anxious.        PE:  Vitals:    05/08/19 1459   Weight: (!) 169.3 kg (373 lb 3.8 oz)   Height: 5' 9" (1.753 m)       Physical Exam   Constitutional: He is oriented to person, place, and time. He appears well-developed and well-nourished.   HENT:   Head: Normocephalic and atraumatic.   Eyes: Pupils are equal, round, and reactive to light. Conjunctivae and EOM are normal.   Neck: Normal range of motion. Neck supple. No JVD present. No thyromegaly present.   Cardiovascular: Normal rate, regular rhythm, normal heart sounds and intact distal pulses.   No murmur heard.  Pulmonary/Chest: Effort normal and breath sounds normal. He has no wheezes.   Abdominal: Soft. Bowel sounds are normal. He exhibits no distension. There is no tenderness. There is no guarding.   Musculoskeletal: Normal range of motion. He exhibits edema (BLE trace ). He exhibits no tenderness.   Neurological: He is alert and oriented to person, place, and time. Coordination normal.   Skin: Skin is warm and dry. Capillary refill takes less than 2 seconds. No erythema. No pallor.   Psychiatric: He has a normal mood and affect. His behavior is normal. Judgment and thought content normal.        DIAGNOSIS:  1. Morbid Obesity with Body mass index is 55.12 kg/m². and inability to lose weight and maintain weight loss.  2. Co-morbidities: CHF, ORIN, HTN, Depression     PLAN:  The patient is Potential candidate for Bariatric Surgery. he is interested in sleeve gastrectomy with Dr." Roopa. The surgery and post-op care were discussed in detail with the patient. All questions were answered.    he understands that bariatric surgery is a tool to aid in weight loss and that he needs to be committed to the diet and exercise post-operatively for successful weight loss.  Discussed expected weight loss outcomes after surgery which is 50% of the excess weight on his frame.  Goal weight is 260#s.  Discussed with patient that bariatric surgery is not the easy way out and that it will take plenty of dedication on the patient's part to be successful. Also discussed the possibility of weight regain if the patient strays from the diet guidelines or exercise requirements. Patient verbalized understanding and wishes to proceed with the work-up.    ORDERS:  1. Chest X-Ray and EKG  2. Psychological Consult, Bariatric Dietician Consult and Heart transplant clearance  3. Bariatric Labs: BMP, CBC, Folate Serum, H. pylori, HgA1C, Hepatic Panel/LFT, Iron & TIBC, Lipid Profile, Magnesium, Phosphate, T3, T4, TSH, Free T4, Vitamin B12, and Vitamin B1.      Primary Physician: Germán Richards MD  RTC: As scheduled.    60 minute visit, over 50% of time spent counseling patient face to face on surgical options, risks, benefits, expected diet, recommended exercise regimen, and expected weight loss.

## 2019-05-08 NOTE — LETTER
May 8, 2019      Israel Mariee MD  [address]  [facility] [address]           [facility] - B[facility]ric [facility]  [address]  [facility] [address]  Phone: [phone]  Fax: [phone]          Patient: Trell Landaverde   MR Number: [id number]   YOB: 1980   Date of Visit: 5/8/2019       Dear Dr. Israel Mariee:    Thank you for referring Trell Landaverde to me for evaluation. Attached you will find relevant portions of my assessment and plan of care.    If you have questions, please do not hesitate to call me. I look forward to following Trell Landaverde along with you.    Sincerely,    Angy Canales PA-C    Enclosure  CC:  No Recipients    If you would like to receive this communication electronically, please contact [email or web address] or [phone] to request more information on [employer] Link access.    For providers and/or their staff who would like to refer a patient to [facility], please contact us through our one-stop-shop provider referral line, [facility] , at [phone].    If you feel you have received this communication in error or would no longer like to receive these types of communications, please e-mail [email or web address]

## 2019-05-10 ENCOUNTER — TELEPHONE (OUTPATIENT)
Dept: BARIATRICS | Facility: CLINIC | Age: 39
End: 2019-05-10

## 2019-05-10 DIAGNOSIS — A04.8 POSITIVE H. PYLORI TEST: Primary | ICD-10-CM

## 2019-05-10 DIAGNOSIS — E55.9 VITAMIN D DEFICIENCY: ICD-10-CM

## 2019-05-10 RX ORDER — TETRACYCLINE HYDROCHLORIDE 500 MG/1
500 CAPSULE ORAL EVERY 6 HOURS
Qty: 56 CAPSULE | Refills: 0 | Status: SHIPPED | OUTPATIENT
Start: 2019-05-10 | End: 2019-05-13

## 2019-05-10 RX ORDER — BISMUTH SUBCITRATE POTASSIUM, METRONIDAZOLE AND TETRACYCLINE HYDROCHLORIDE 140; 125; 125 MG/1; MG/1; MG/1
3 CAPSULE ORAL
Qty: 168 CAPSULE | Refills: 0 | Status: SHIPPED | OUTPATIENT
Start: 2019-05-10 | End: 2019-05-10

## 2019-05-10 RX ORDER — METRONIDAZOLE 500 MG/1
500 TABLET ORAL EVERY 6 HOURS
Qty: 56 TABLET | Refills: 0 | Status: SHIPPED | OUTPATIENT
Start: 2019-05-10 | End: 2019-05-24

## 2019-05-10 RX ORDER — ERGOCALCIFEROL 1.25 MG/1
50000 CAPSULE ORAL
Qty: 24 CAPSULE | Refills: 0 | Status: SHIPPED | OUTPATIENT
Start: 2019-05-13

## 2019-05-10 RX ORDER — OMEPRAZOLE 20 MG/1
20 CAPSULE, DELAYED RELEASE ORAL 2 TIMES DAILY
Qty: 28 CAPSULE | Refills: 0 | Status: SHIPPED | OUTPATIENT
Start: 2019-05-10 | End: 2019-09-11

## 2019-05-10 NOTE — TELEPHONE ENCOUNTER
----- Message from Zaid Bowman sent at 5/10/2019  4:21 PM CDT -----  Contact: [facility]   Pharmacy Calling    Reason for call: Pharmacy states pt insurance will not cover tetracycline  medication but will pay for minocycline or doxycycline    Pharmacy Name:   [address] [address] [facility]  [address] [facility] [address]  Phone: [phone] Fax: [phone]    Prescription Name: tetracycline    Phone Number: [phone]    Additional Information:

## 2019-05-10 NOTE — TELEPHONE ENCOUNTER
Spoke with Alda at pharmacy, she stated that the tetracycline was not covered by insurance and that it could cost $515 for 56 capsules.  She stated that Minocycline and doxycycline is covered.  Discussed with REBEKAH Fortune, she said that those medications are not indicated for H.pylori. And that he can not take the clarythramycin because of his amiodarone.  She will consult GI on Monday for alternatives.  Notified pharmacy to place prescriptions on hold.  Patient aware.

## 2019-05-10 NOTE — TELEPHONE ENCOUNTER
Left msg for pt to  rxs at pharmacy on file and to read his results from his [facility] portal in which Angy OCAMPO explains what she has prescribed for him including antibiotics and vitamin D.  Provided contact information.  ----- Message from Angy Canales PA-C sent at 5/10/2019 10:36 AM CDT -----  + H. Pylori  And low vitamin D, pt notified via [facility]. Rx sent to pharmacy.    Please make sure pt picks up prescription and takes.    Thanks,  BM

## 2019-05-10 NOTE — TELEPHONE ENCOUNTER
----- Message from Zaid Bowman sent at 5/10/2019 11:35 AM CDT -----  Needs Advice    Reason for call: pt calling to speak with someone in regards to the medication that was sent over to the pharmacy. Pt states his insurance will not cover the medication and he needs a new medication prescribed. Pt also states he needs to speak with the nurse about scheduling an ekg.    [address]  [address] [facility] [address]  Phone: [phone] Fax: [phone]    Communication Preference:  [phone]       Additional Information: please contact caller regarding the matter

## 2019-05-13 ENCOUNTER — DOCUMENTATION ONLY (OUTPATIENT)
Dept: BARIATRICS | Facility: CLINIC | Age: 39
End: 2019-05-13

## 2019-05-13 ENCOUNTER — TELEPHONE (OUTPATIENT)
Dept: BARIATRICS | Facility: CLINIC | Age: 39
End: 2019-05-13

## 2019-05-13 DIAGNOSIS — A04.8 POSITIVE H. PYLORI TEST: Primary | ICD-10-CM

## 2019-05-13 RX ORDER — DOXYCYCLINE HYCLATE 50 MG/1
100 CAPSULE ORAL 2 TIMES DAILY
Qty: 56 CAPSULE | Refills: 0 | Status: SHIPPED | OUTPATIENT
Start: 2019-05-13 | End: 2019-05-27

## 2019-05-13 NOTE — PROGRESS NOTES
Bariatric Surgery Online Course Form Submission  Someone has submitted a Bariatric Surgery Online Course Form Submission on this page.  Date: 05- 16:21:17  Patient's Name: Trell Landaverde  YOB: 1980 Email: [email or web address]  Phone: [phone]   Patient Address: [address] [place] [address] [place] [address]  Preferred Surgical Location: [facility] [place]   I certify that I am 18 years of age or older: Yes   Confirmation Code: [id number]  Verification of Bariatric Seminar: yes  Insurance Information  Insurance or Self Pay? Insurance - Fill out fields below  Insurance Company Name: [employer]   Type of Coverage/Coverage Plan (i.e. PPO, HMO): [employer] Gold HMO [facility]   Group Name:   Subscriber Name:   Member Name (patient's name): Trell Landaverde   Member ID/Policy #:[id number]   Plan Effective Date: 12/13/2018  Card Issuance date:

## 2019-05-13 NOTE — TELEPHONE ENCOUNTER
----- Message from Apolinar Cardona sent at 5/13/2019 10:33 AM CDT -----  Contact: [facility]  ([place])  Pharmacy Calling    Reason for call: The pharmacy is calling about instructions on an Rx.    Pharmacy Name: [facility]    Prescription Name: Omeprazole    Phone Number: [phone] Peter    Additional Information:

## 2019-05-13 NOTE — TELEPHONE ENCOUNTER
Received information about alternative treatment for H pylori, order placed, will have MA contact pharmacy to place orders off of hold.     Spoke to pt about orders, bismuth can be found OTC ( may be a cheaper alternative) along with OTC prilosec ( he will weigh his options). \    Huang Canales PA-C

## 2019-05-13 NOTE — TELEPHONE ENCOUNTER
Spoke to pharmacy ( Alda) clarified that instructions on Prilosec were to say take 20 mg capsule two times daily with doxycycline instead of Biaxin.     Pharmacy repeated order and verified doxycycline prescription receipt.    Huang Canales PA-C

## 2019-05-13 NOTE — TELEPHONE ENCOUNTER
----- Message from Laura Roblero sent at 5/13/2019  9:50 AM CDT -----  Regarding: Initial consult w dietitian  Can one of you reach out to schedule RD visit?

## 2019-05-17 ENCOUNTER — TELEPHONE (OUTPATIENT)
Dept: BARIATRICS | Facility: CLINIC | Age: 39
End: 2019-05-17

## 2019-05-17 NOTE — TELEPHONE ENCOUNTER
----- Message from Zaid Bowman sent at 5/17/2019  2:41 PM CDT -----  Contact: pt   Needs Advice    Reason for call: pt calling to speak with nurse in regards to scheduling an appt. Pt would like to come in on May 31st if possible to meet with Sabrina Yang. The pt already has an appt on that day and due to him living in [place] would like to only make one trip. Pt also states he would like to schedule his ekg for 05/31.      Communication Preference: [phone]     Additional Information: please contact pt regarding this matter

## 2019-05-29 ENCOUNTER — PATIENT MESSAGE (OUTPATIENT)
Dept: BARIATRICS | Facility: CLINIC | Age: 39
End: 2019-05-29

## 2019-05-31 ENCOUNTER — CLINICAL SUPPORT (OUTPATIENT)
Dept: BARIATRICS | Facility: CLINIC | Age: 39
End: 2019-05-31
Payer: MEDICARE

## 2019-05-31 ENCOUNTER — HOSPITAL ENCOUNTER (OUTPATIENT)
Dept: CARDIOLOGY | Facility: CLINIC | Age: 39
Discharge: HOME | End: 2019-05-31
Payer: MEDICARE

## 2019-05-31 VITALS — BODY MASS INDEX: 46.65 KG/M2 | WEIGHT: 315 LBS | HEIGHT: 69 IN

## 2019-05-31 DIAGNOSIS — I50.22 CHRONIC SYSTOLIC CONGESTIVE HEART FAILURE: ICD-10-CM

## 2019-05-31 DIAGNOSIS — G47.33 OSA (OBSTRUCTIVE SLEEP APNEA): ICD-10-CM

## 2019-05-31 DIAGNOSIS — I10 BENIGN ESSENTIAL HYPERTENSION: ICD-10-CM

## 2019-05-31 DIAGNOSIS — Z71.3 NUTRITIONAL COUNSELING: ICD-10-CM

## 2019-05-31 DIAGNOSIS — E66.01 MORBID OBESITY WITH BMI OF 50.0-59.9, ADULT: ICD-10-CM

## 2019-05-31 PROCEDURE — 97802 PR MED NUTR THER, 1ST, INDIV, EA 15 MIN: ICD-10-PCS | Mod: S$GLB,TXP,, | Performed by: DIETITIAN, REGISTERED

## 2019-05-31 PROCEDURE — 99999 PR PBB SHADOW E&M-EST. PATIENT-LVL II: ICD-10-PCS | Mod: PBBFAC,TXP,, | Performed by: DIETITIAN, REGISTERED

## 2019-05-31 PROCEDURE — 93010 ELECTROCARDIOGRAM REPORT: CPT | Mod: S$GLB,TXP,, | Performed by: INTERNAL MEDICINE

## 2019-05-31 PROCEDURE — 93005 EKG 12-LEAD: ICD-10-PCS | Mod: S$GLB,TXP,, | Performed by: PHYSICIAN ASSISTANT

## 2019-05-31 PROCEDURE — 99499 NO LOS: ICD-10-PCS | Mod: S$GLB,TXP,, | Performed by: DIETITIAN, REGISTERED

## 2019-05-31 PROCEDURE — 99499 UNLISTED E&M SERVICE: CPT | Mod: S$GLB,TXP,, | Performed by: DIETITIAN, REGISTERED

## 2019-05-31 PROCEDURE — 99999 PR PBB SHADOW E&M-EST. PATIENT-LVL II: CPT | Mod: PBBFAC,TXP,, | Performed by: DIETITIAN, REGISTERED

## 2019-05-31 PROCEDURE — 93005 ELECTROCARDIOGRAM TRACING: CPT | Mod: S$GLB,TXP,, | Performed by: PHYSICIAN ASSISTANT

## 2019-05-31 PROCEDURE — 97802 MEDICAL NUTRITION INDIV IN: CPT | Mod: S$GLB,TXP,, | Performed by: DIETITIAN, REGISTERED

## 2019-05-31 PROCEDURE — 93010 EKG 12-LEAD: ICD-10-PCS | Mod: S$GLB,TXP,, | Performed by: INTERNAL MEDICINE

## 2019-05-31 RX ORDER — CARVEDILOL 25 MG/1
25 TABLET ORAL 2 TIMES DAILY
Qty: 60 TABLET | Refills: 11 | Status: SHIPPED | OUTPATIENT
Start: 2019-05-31 | End: 2019-08-08 | Stop reason: SDUPTHER

## 2019-05-31 NOTE — PROGRESS NOTES
"NUTRITIONAL CONSULT    Referring Physician: Erik Blas M.D.  Reason for MNT Referral: Initial assessment for sleeve gastrectomy work-up    PAST MEDICAL HISTORY:   38 y.o. male  Weight history includes about 10 years ago 250lbs then slowly started increasing.  Dieting attempts include exercising 3x's, and Weight Watchers, vegan diet. Pt has lost 6lbs since seeing PA, decreased salt intake.    Past Medical History:   Diagnosis Date    Allergy     Cardiomyopathy     CHF (congestive heart failure)     Depression     Hypertension     Obesity        CLINICAL DATA:  38 y.o.-year-old Black or  male.  Height: 5'9"  Weight: 367 lbs  IBW: 160 lbs  BMI: 54.24  The patient's goal weight (50% EBW): 263 lbs  Personal goal weight: 250 lbs    Goal for Bariatric Surgery: to improve quality of life, to lose weight and improve heart health    NUTRITIONAL NEEDS:  2580 x 1.2 activity factor = 3096 - 1000 for wt loss = 2096 Calories (using [place] [address] Jeor Equation)  109-131 Grams Protein (1.5-1.8 gm/kg IBW)    NUTRITION & HEALTH HISTORY:  Greatest challenge: starchy CHO and only eats once per day    Current diet recall:   Breakfast: skips  Lunch: skips   Dinner (6-8pm): steak with potato or tacos or spaghetti or pot roast with potatoes or hot dogs or potato with salad    Current Diet:  Protein supplements: whey protein powder (root beer flavor - 20 gm protein)  Snacking: none  Vegetables: Likes a few. Eats 2-3 times per week.  Fruits: Likes a variety. Eats almost daily.  Beverages: water - 2L fluid restriction per day  Dining out: Monthly. Mostly fast food and restaurants. 1-2/month  Cooking at home: Daily. Mostly baked, grilled and air fryer meat, fish, starchy CHO and vegetables.    Exercise:  Current exercise: Adequate - 4/wk - gym (cardio with weights - 1 hours)  Restrictions to exercise: heart rate limit (pt unsure but does have it written down)    Vitamins / Minerals / Herbs:   Vit D      Labs: "   reviewed    Food Allergies:   Orange  shellfish  *lactose-intolerant    Social:  part-time (2-3 days per week - day time)  Lives with spouse, daughter 16y, son 15y.  Grocery shopping and food prep - everybody participates.  Patient believes the household will be supportive after surgery.  Alcohol: None.  Smoking: None.    ASSESSMENT:  · Patient reports attempts at weight loss, only to regain lost weight.  · Patient demonstrated knowledge of healthy eating behaviors and exercise patterns; admits to not eating healthy and not exercising at this point.  · Patient states willingness to change lifestyle and make behavior modifications as evidenced by plan to make healthier lifestyle changes after today's visit..    Insurance does not require medically supervised diet prior to consideration for bariatric surgery.    Barriers to Education: none    Stage of change: determination    NUTRITION DIAGNOSIS:    Obesity related to Excessive calorie intake as evidence by BMI.    BARIATRIC DIET DISCUSSION/PLAN:  Discussed diet after surgery and related to patient's food record.  Reviewed nutrition guidelines for before and after surgery.  Answered all questions.  Continue to review Bariatric Nutrition Guidebook at home and call with any questions.  Work on expanding variety of vegetables.  Work on gradually cutting back on starchy CHO in the diet.  Begin trying various protein supplements to determine preference.  5-6 meals per day.  Start including protein supplements in the diet plan daily.  Return to clinic.  keep food log   Recommend probiotic pills or may consume yogurt, probiotic drinks.    RECOMMENDATIONS:  Patient is a potential candidate for bariatric surgery.    Needs additional visit(s) with RD.    Patient and Spouse verbalized understanding.    Expect good  compliance after surgery at this time.    Communicated nutrition plan with bariatric team.    SESSION TIME:  60 minutes

## 2019-05-31 NOTE — PATIENT INSTRUCTIONS
Continue to review Bariatric Nutrition Guidebook at home and call with any questions.  Work on expanding variety of vegetables.  Work on gradually cutting back on starchy CHO in the diet.  Begin trying various protein supplements to determine preference.  5-6 meals per day.  Start including protein supplements in the diet plan daily.  Return to clinic.  keep food log   Recommend probiotic pills or may consume yogurt, probiotic drinks.    Meal Ideas for Regular Bariatric Diet  *Recipes and products available at [email or web address]      Breakfast: (15-20g protein)    - Egg white omelet: 2 egg whites or ½ cup Egg Beaters. (Optional proteins: cheese, shrimp, black beans, chicken, sliced turkey) (Optional veggies: tomatoes, salsa, spinach, mushrooms, onions, green peppers, or small slice avocado)     - Egg and sausage: 1 egg or ¼ cup Egg Beaters (any variety), with 1 wendie or 2 links of Turkey sausage or Veggie breakfast sausage (Culturalite or [place] Temperature [employer])    - Crust-less breakfast quiche: To make a glass pie dish, mix 4oz part skim Ricotta, 1 cup skim milk, and 2 eggs as your base. Add protein: shredded cheese, sliced lean ham or turkey, turkey grajeda/sausage. Add veggies: tomato, onion, green onion, mushroom, green pepper, spinach, etc.    - Yogurt parfait: Mix 1 - 6oz container Dannon Light N Fit vanilla yogurt, with ¼ cup crushed unsalted nuts    - Cottage cheese and fruit: ½ cup part-skim cottage cheese or ricotta cheese topped with fresh fruit or sugar free preserves     - Ayla Warren's Vanilla Egg custard* (add 2 Tbsp instant coffee granules to make Cappuccino Custard*)    - Hi-Protein café latte (skim milk, decaf coffee, 1 scoop protein powder). Optional to add Sugar free syrup or extract flavoring.    - Breakfast Lox: spread fat free cream cheese on slices of smoked salmon. Serve over scrambled or egg over easy (sauteed with nonstick cookspray) OR on a cucumber slice    - Eggwhich: Scramble or cook 1 large  egg over easy using nonstick cookspray. Place between 2 slices of American grajeda and low fat cheese.     Lunch: (20-30g protein)    - ½ cup Black bean soup (Homemade or Progresso), with ¼ cup shredded low-fat cheese. Top with chopped tomato or fresh salsa.     - Lean deli turkey breast and low-fat sliced cheese, mustard or light sandoval to moisten, rolled up together, or wrapped in a Natalio lettuce leaf    - Chicken salad made from dinner leftovers, moisten with low-fat salad dressing or light sandoval. Also try leftover salmon, shrimp, tuna or boiled eggs. Serve ½ cup over dark green salad    - Fat-free canned refried beans, topped with ¼ cup shredded low-fat cheese. Top with chopped tomato or fresh salsa.     - Greek salad: Top mixed greens with 1-2oz grilled chicken, tomatoes, red onions, 2-3 kalamata olives, and sprinkle lightly with feta cheese. Spritz with Balsamic vinegar to taste.     - Crust-less lunch quiche: To make a glass pie dish, mix 4oz part skim Ricotta, 1 cup skim milk, and 2 eggs as your base. Add protein: shredded cheese, sliced lean ham or turkey, shrimp, chicken. Add veggies: tomato, onion, green onion, mushroom, green pepper, spinach, artichoke, broccoli, etc.    - Pizza bake: spread a  mari timo mushroom with tomato sauce, low-fat shredded mozzarella and turkey pepperoni or [place] grajeda. Add any veggies. Roast for 10-15 minutes, until cheese melted.     - Cucumber crab bites: Spread ¼ cup crab dip (lump crabmeat + light cream cheese and green onions) over sliced cucumber.     - Chicken with light spinach and artichoke dip*: Puree in : 6oz cooked and drained spinach, 2 cloves garlic, 1 can cannelloni beans, ½ cup chopped green onions, 1 can drained artichoke hearts (not marinated in oil), lemon juice and basil. Mix in 2oz chopped up chicken.    Supper: (20-30g protein)    - Serve grilled fish over dark green salad tossed with low-fat dressing, served with grilled asparagus jenkins      - Rotisserie chicken salad: served with sliced strawberries, walnuts, fat-free feta cheese crumbles and 1 tbsp Marss Own Light Raspberry White Post Vinaigrette    - Shrimp cocktail: Dip cold boiled shrimp in homemade low-sugar cocktail sauce (1/2 cup Logan One Carb ketchup, 2 tbsp horseradish, 1/4 tsp hot sauce, 1 tsp Worcestershire sauce, 1 tbsp freshly-squeezed lemon juice). Serve with dark green salad, walnuts, and crumbled blue cheese drizzled with olive oil and Balsamic vinegar    - Tuna Melt: Spread tuna salad onto 2 thick slices of tomato. Top with low-fat cheese and broil until cheese is melted. May also be made with chicken salad of shrimp salad. [place] with different types of cheeses.    - Chicken or beef fajitas (no tortilla, rice, beans, chips). Top meat and veggies w/ fresh salsa, fat free sour cream.     - Homemade low-fat Chili using extra lean ground beef or ground turkey. Top with shredded cheese and salsa as desired. May add dollop fat-free sour cream if desired    - Chicken parmesan: Top chicken breast w/ low sugar marinara sauce, mozzarella cheese and bake until chicken reaches 165*.  Serve w/ spaghetti SQUASH or Danish cut green beans    - Dinner Omelet with shrimp or chicken and onion, green peppers and chives.    - No noodle lasagna: Use sliced zucchini or eggplant in place of noodles.  Layer with part skim ricotta cheese and low sugar meat sauce (use very lean ground beef or ground turkey).    - Mexican chicken bake: Bake chunks of chicken breast or thigh with taco seasoning, Pace brand enchilada sauce, green onions and low-fat cheese. Serve with ¼ cup black beans or fat free refried beans topped with chopped tomatoes or salsa.    - Jazmin frozen meatballs, simmered in Classico Marinara sauce. Different flavors of salsa or spaghetti sauce create different dishes! Sprinkle with parmesan cheese. Serve with grilled or steamed veggies, or a dark green salad.    - Dino julian  skinless chicken thigh chunks in Classico Marinara sauce or roasted salsa until tender with chopped onion, bell pepper, garlic, mushrooms, spinach, etc.     - Hamburger or veggie burger, without the bun, dressed the way you like. Served with grilled or steamed veggies.    - Eggplant parmesan: Bake slices of eggplant at 350 degrees for 15 minutes. Layer tomato sauce, sliced eggplant and low-fat mozzarella cheese in a baking dish and cover with foil. Bake 30-40 more minutes or until bubbly. Uncover and bake at 400 degrees for about 15 more minutes, or until top is slightly crisp.    - Fish tacos: grilled/baked white fish, wrapped in Natalio lettuce leaf, topped with salsa, shredded low-fat cheese, and light coleslaw.    - Chicken andi: Sprinkle chicken w/ 1 tsp of [place] ranch dip mix. Then grill chicken and top with black beans, salsa and 1 tsp fat free sour cream.     - Cauliflower pizza crust: Use cauliflower as crust (see recipe on pinterest, no flour!). Top w/ low fat cheese, turkey pepperoni and veggies and bake again    - chicken or turkey crust pizza: use ground chicken or turkey instead of cauliflower, spread in [place] Tayagungin and bake at 350 for about 20-30 minutes(may want to add garlic, black pepper, oregano and other herbs to ground meat mixture).  Remove and top w/ low fat cheese, turkey pepperoni and veggies and bake again for another 10 minutes or until cheese is browned.     Snacks: (100-200 calories; >5g protein)    - 1 low-fat cheese stick with 8 cherry tomatoes or 1 serving fresh fruit  - 4 thin slices fat-free turkey breast and 1 slice low-fat cheese  - 4 thin slices fat-free honey ham with wedge of melon  - 6-8 edamame pods (equivalent to about 1/4 cup edamame without pods).   - 1/4 cup unsalted nuts with ½ cup fruit  - 6-oz container Dannon Light n Fit vanilla yogurt, topped with 1oz unsalted nuts         - apple, celery or baby carrots spread with 2 Tbsp PB2  - apple slices with 1 oz slice  low-fat cheese  - Apple slices dipped in 2 Tbsp of PB2  - celery, cucumber, bell pepper or baby carrots dipped in ¼ cup hummus bean spread or light spinach and artichoke dip (*recipe in lunch section)  - celery, cucumber, baby carrots dipped in high protein greek yogurt (Mix 16 oz plain greek yogurt + 1 packet of hidden [place] ranch dip mix)  - Eleno Links Beef Steak - 14g protein! (similar to beef jerky)  - 2 wedges [place] - Light Herb & Garlic Cheese with sliced cucumber or green bell pepper  - 1/2 cup low-fat cottage cheese with ¼ cup fruit or ¼ cup salsa  - RTD Protein drinks: Atkins, Low Carb Slim Fast, EAS light, Muscle Milk Light, etc.  - Homemade Protein drinks: GNC Soy95, Isopure, Nectar, UNJURY, Whey Gourmet, etc. Mix 1 scoop powder with 8oz skim/1% milk or light soymilk.  - Protein bars: Atkins, EAS, Pure Protein, Think Thin, Detour, etc. Must have 0-4 grams sugar - Read the label.    Takeout Options: No more than twice/week  Deli - Salads (no pasta or rice), meats, cheeses. Roasted chicken. Lox (salmon)    Mexican - Platters which don't include tortillas, chips, or rice. Go easy on the beans. Example: Fajitas without the tortillas. Ask the  not to bring chips to the table if they are too tempting.    Greek - Meat or fish and vegetable, but no bread or rice. Including hummus, baba ganoush, etc, is OK. Most sit-down Greek restaurants can provide you with cucumber slices for dipping instead of nydia bread.    Fast Food (Avoid as much as possible) - Salads (no croutons and limit salad dressing to 2 tbsp), grilled chicken sandwich without the bun and ask for no sandoval. Flakitas low fat chili or Taco Bell pintos and cheese.    BBQ - The meats are fine if you ask for sauces on the side, but most of the traditional side dishes are loaded with carbs. Kyle slaw, baked beans and BBQ sauce are typically made with sugar.    Chinese - Nothing deep-fried, no rice or noodles. Many Chinese sauces have starch and  sugar in them, so you'll have to use your judgement. If you find that these sauces trigger cravings, or cause Dumping, you can ask for the sauce to be made without sugar or just use soy sauce.    Bariatric Diet Grocery List      High in Protein:   ? Canned tuna or chicken (packed in water)  ? Lean ground turkey breast or ground round  ? Turkey or chicken (no skin)  ? Lean pork or beef   ? Scrambled, poached, or boiled eggs  ? Baked or broiled fish and seafood (not fried!)  ? Beans, edamame and lentils  ? Low fat deli meats: turkey, chicken, ham, roast beef  ? 1% or Skim Milk, Lactaid, or Soymilk  ? Low-fat cheese, cottage cheese, mozzarella string cheese, ricotta  ? Light yogurt, FF/SF frozen yogurt, custard, SF pudding  ? Protein drinks and protein bars with 0-4 grams sugar     Fruits, Vegetables and Snacks   - Green beans, broccoli, cauliflower, spinach, asparagus, carrots, lima beans, yellow squash, zucchini, etc.  - Apple, pineapple, peach, grapes, banana, watermelon, oranges, etc.  - Fruit canned in its own juice or in water (not in syrup)  - Raw veggies  - Lettuce: dark greens like spinach and Natalio  - Unsalted Nuts  - Eleno Links beef jerky     Fluids:   Skim/1% milk, Lactaid, Soymilk  Sugar-free beverages  (decaf and non-carbonated)  Decaf coffee & decaf tea   Water       1200- 1500 calorie Sample meal plan  80-120g protein per day.   Protein drinks and bars: 0-4 grams sugar  Drink lots of water throughout the day and exercise!  MENU # 1  Breakfast: 2 eggs, 1 turkey sausage wendie, 1 apple  Snack: Atkins bar  Lunch: 2 roll-ups (sliced turkey, low-fat sliced cheese, mustard), 12 baby carrots dipped in 1 Tbsp natural peanut butter  Mid-Day Snack: ¼ cup unsalted almonds, ½ cup fruit  Dinner: 1 chicken thigh simmered in tomato sauce + 2 Tbsp mozzarella cheese, ½ cup black beans, 1/2 cup steamed carrots  Evening Snack: 1/2 cup grapes with 4 cubes low-fat cheddar cheese    ___________________________________________________  MENU # 2  Breakfast: 200 calorie protein drink  Mid-morning snack : ¼ cup unsalted nuts, medium banana  Lunch: 3oz tuna or chicken salad made with 2 tbsp light sandoval, over salad with tomatoes and cucumbers.   Snack: low-fat cheese stick  Dinner: 3oz hamburger wendie, slice of low-fat cheese, 1 cup boiled yellow squash and zucchini.   Snack: 6oz light yogurt  _______________________________________________________  Menu #3  Breakfast: 6oz plain Greek yogurt + fruit (½ banana, ½ cup fruit - pineapple, blueberries, strawberries, peach), may add Splenda to skyler.  Lunch: Grilled  chicken breast w/ slice low-fat pepper emeli cheese, 1/2 cup grilled/baked asparagus and small salad with Salad Spritzer.    Mid-Day snack: 200 calorie protein drink   Dinner: 4oz Grilled fish, ½ cup white beans, ½ cup cooked spinach  Evening Snack: fudgsicle no-sugar-added    Menu # 4  Breakfast: 1 scoop vanilla protein powder + 4oz skim milk + 4oz coffee   Snack: Pure protein bar  Lunch: 2 Lettuce tacos: 3oz seasoned ground turkey wrapped in a Natalio lettuce leaves with 1 Tbsp shredded cheese and dollop low-fat sour cream  Snack: ½ cup cottage cheese, ½ cup pineapple chunks  Dinner: Shrimp omelet: 2 eggs, ½ cup shrimp, green onions, and shredded cheese  ______________________________________________________  Menu #5  Breakfast: 1 cup low-fat cottage cheese, ½ cup peaches (no sugar added)  Snack: 1 apple, 4 cubes cheese  Lunch: 3oz baked pork chop, 1 cup okra and tomato stew  Snack: 1/2 cup black beans + salsa + dollop light sour cream  Dinner: Caprese chicken salad: 3 oz chicken breast, 1oz fresh mozzarella, sliced tomato, 1 Tbsp olive oil, basil  Snack: sugar-free popsicle    Menu #6  Breakfast: ½ cup part-skim ricotta cheese, 2 Tbsp sugar-free strawberry preserves, sprinkle of slivered almonds  Snack: 1 orange  Lunch: 3 oz canned chicken, 1oz shredded cheddar cheese, ½  cup black beans,  2 Tbsp salsa  Snack: 200 calorie Protein drink  Dinner: 4 oz grilled salmon steak, over mixed green salad with 2 tbsp light dressing

## 2019-06-20 ENCOUNTER — CLINICAL SUPPORT (OUTPATIENT)
Dept: BARIATRICS | Facility: CLINIC | Age: 39
End: 2019-06-20
Payer: MEDICARE

## 2019-06-20 ENCOUNTER — OFFICE VISIT (OUTPATIENT)
Dept: PSYCHIATRY | Facility: CLINIC | Age: 39
End: 2019-06-20
Payer: MEDICARE

## 2019-06-20 VITALS — HEIGHT: 69 IN | BODY MASS INDEX: 46.65 KG/M2 | WEIGHT: 315 LBS

## 2019-06-20 DIAGNOSIS — Z01.818 PREOPERATIVE EVALUATION TO RULE OUT SURGICAL CONTRAINDICATION: Primary | ICD-10-CM

## 2019-06-20 DIAGNOSIS — Z71.3 NUTRITIONAL COUNSELING: ICD-10-CM

## 2019-06-20 DIAGNOSIS — F32.A DEPRESSION, UNSPECIFIED DEPRESSION TYPE: ICD-10-CM

## 2019-06-20 DIAGNOSIS — G47.33 OSA (OBSTRUCTIVE SLEEP APNEA): ICD-10-CM

## 2019-06-20 DIAGNOSIS — E66.01 MORBID OBESITY DUE TO EXCESS CALORIES: ICD-10-CM

## 2019-06-20 DIAGNOSIS — E66.01 MORBID OBESITY WITH BMI OF 50.0-59.9, ADULT: ICD-10-CM

## 2019-06-20 DIAGNOSIS — F41.1 GAD (GENERALIZED ANXIETY DISORDER): ICD-10-CM

## 2019-06-20 DIAGNOSIS — I10 BENIGN ESSENTIAL HYPERTENSION: ICD-10-CM

## 2019-06-20 DIAGNOSIS — I10 HYPERTENSION, UNSPECIFIED TYPE: ICD-10-CM

## 2019-06-20 PROCEDURE — 90791 PSYCH DIAGNOSTIC EVALUATION: CPT | Mod: NTX,,, | Performed by: PSYCHOLOGIST

## 2019-06-20 PROCEDURE — 96131 PSYCL TST EVAL PHYS/QHP EA: CPT | Mod: NTX,,, | Performed by: PSYCHOLOGIST

## 2019-06-20 PROCEDURE — 96138 PSYCL/NRPSYC TECH 1ST: CPT | Mod: NTX,,, | Performed by: PSYCHOLOGIST

## 2019-06-20 PROCEDURE — 99999 PR PBB SHADOW E&M-EST. PATIENT-LVL I: CPT | Mod: PBBFAC,TXP,, | Performed by: PSYCHOLOGIST

## 2019-06-20 PROCEDURE — 96138 PR PSYCH/NEUROPSYCH TEST ADMIN/SCORING, BY TECH, 2+ TESTS, 1ST 30 MIN: ICD-10-PCS | Mod: NTX,,, | Performed by: PSYCHOLOGIST

## 2019-06-20 PROCEDURE — 99499 UNLISTED E&M SERVICE: CPT | Mod: S$GLB,TXP,, | Performed by: DIETITIAN, REGISTERED

## 2019-06-20 PROCEDURE — 99999 PR PBB SHADOW E&M-EST. PATIENT-LVL III: ICD-10-PCS | Mod: PBBFAC,TXP,, | Performed by: DIETITIAN, REGISTERED

## 2019-06-20 PROCEDURE — 99999 PR PBB SHADOW E&M-EST. PATIENT-LVL I: ICD-10-PCS | Mod: PBBFAC,TXP,, | Performed by: PSYCHOLOGIST

## 2019-06-20 PROCEDURE — 96130 PR PSYCHOLOGIC TEST EVAL SVCS, 1ST HR: ICD-10-PCS | Mod: NTX,,, | Performed by: PSYCHOLOGIST

## 2019-06-20 PROCEDURE — 96139 PSYCL/NRPSYC TST TECH EA: CPT | Mod: NTX,,, | Performed by: PSYCHOLOGIST

## 2019-06-20 PROCEDURE — 96139 PR PSYCH/NEUROPSYCH TEST ADMIN/SCORING, BY TECH, 2+ TESTS, EA ADDTL 30 MIN: ICD-10-PCS | Mod: NTX,,, | Performed by: PSYCHOLOGIST

## 2019-06-20 PROCEDURE — 97803 PR MED NUTR THER, SUBSQ, INDIV, EA 15 MIN: ICD-10-PCS | Mod: S$GLB,TXP,, | Performed by: DIETITIAN, REGISTERED

## 2019-06-20 PROCEDURE — 99499 NO LOS: ICD-10-PCS | Mod: S$GLB,TXP,, | Performed by: DIETITIAN, REGISTERED

## 2019-06-20 PROCEDURE — 90791 PR PSYCHIATRIC DIAGNOSTIC EVALUATION: ICD-10-PCS | Mod: NTX,,, | Performed by: PSYCHOLOGIST

## 2019-06-20 PROCEDURE — 96131 PR PSYCHOLOGIC TEST EVAL SVCS, EA ADDTL HR: ICD-10-PCS | Mod: NTX,,, | Performed by: PSYCHOLOGIST

## 2019-06-20 PROCEDURE — 97803 MED NUTRITION INDIV SUBSEQ: CPT | Mod: S$GLB,TXP,, | Performed by: DIETITIAN, REGISTERED

## 2019-06-20 PROCEDURE — 99999 PR PBB SHADOW E&M-EST. PATIENT-LVL III: CPT | Mod: PBBFAC,TXP,, | Performed by: DIETITIAN, REGISTERED

## 2019-06-20 PROCEDURE — 96130 PSYCL TST EVAL PHYS/QHP 1ST: CPT | Mod: NTX,,, | Performed by: PSYCHOLOGIST

## 2019-06-20 NOTE — PSYCH TESTING
[facility] [address] [place]  [address]  [phone]    REPORT OF PSYCHOLOGICAL TESTING    NAME: Trlel Landaverde  OC #: [id number]  : 1980    REFERRED BY: Erik Blas Jr., M.D.      EVALUATED BY:  Yokasta Riddle, Ph.D., Clinical Psychologist  VICENTE Rai, Psychometrician    DATES OF EVALUATION: 2019, 2019    EVALUATION PROCEDURES AND TIMES:  Conducted by Psychologist (2 hours):  Integration of patient data, interpretation of standardized test results and clinical data, clinical decision-making, treatment planning and report, and interactive feedback to the patient  CPT Codes:  72412 - 1 hour; 33772 - 1 hour  Conducted by Technician (2 hours, 15 minutes):  Psychological test administration and scoring by technician, two or more tests, any method:  [place] Multiphasic Personality Inventory - 2 - Restructured Form (MMPI-2-RF); Pearce Depression Inventory - II (BDI-II); [facility] Diagnostic (MBMD)  CPT Codes:  11113 - 30 minutes; 44642 - 30 minutes, 02959 - 30 minutes, 72881 - 30 minutes (3 additional units)    EVALUATION FINDINGS:  Before this evaluation was initiated, the purposes and process of the assessment and the limits of confidentiality were discussed with the patient who expressed understanding of these issues and orally consented to proceed with the evaluation.    Mr. Landaverde began struggling with his weight in . He was arrested for possession of drugs at that time and placed on house arrest for 3 years. Although he now views this event as mostly positive in helping him turn his life around, he gained a significant amount of weight as he was unable to do anything but be at home or work for 3 years. Shortly after that, he broke his ankle at work and was again unable to leave his home due to poor mobility for 1 year (he had several surgeries to correct it) and gained more weight. He was then misdiagnosed with asthma when he  "actually had heart disease, and his fluid retention and swelling began to increase his weight more until he was able to get on the appropriate medications. Regarding his eating, Mr. Landaverde reports that he has a very poor appetite and that he "barely eats". Typically, he drinks water until 7 PM when he will have a starch-heavy meal (usually pasta or potatoes) and then go right to bed. He reports that he does not snack, eat sweets, eat fast food, or drink soda. He does not consider himself to be an emotional eater. The only weight loss method he has tried consistently has been exercise, as he has not been able to tolerate any diets due to the necessary frequency of meals. His motivation for seeking surgery now is to be a candidate for heart transplant. His heart is currently working at 20% capacity, and he has been told that he may not live past the next year if he cannot get a transplant. His postsurgical goals include: living a longer, healthier life and hopefully going back to work.    Mr. Landaverde has never been in psychiatric treatment, but does report longstanding symptoms of depression and anxiety that he believes are not functionally impairing. He has a history of one suicide attempt (in 2010) during a period where he was rendered immobile for one year due to injury. He denies a history of eating disorder.    At his initial consultation with MsJavan Angy Canales on 05/08/2019, his BMI was 55. His medical comorbidities include: Hypertension and Sleep Apnea. He has met with a bariatric dietician and reports that he is working on making changes to his lifestyle. He was well informed regarding the details of the procedure, as well as the risks of the procedures and post-operative eating restrictions. He appears motivated for change at this time. He was fully cooperative and engaged in the assessment process.    Mr. Landaverde produced a valid and interpretable MMPI-2RF protocol. Validity scales suggest that his results should " be considered a reasonably accurate reflection of his current functioning. While he does not report significant symptoms of a depressive disorder, he does report feeling dissatisfied with his life, feeling insecure and inferior, and feeling useless. He also reports some symptoms of anxiety including intrusive thoughts and nightmares. Mr. Landaverde did endorse one item indicating that he attempted suicide once, and describes the event as 9 years ago in the context of significant psychosocial stressors.    The MBMD indicated that Mr. Landaverde is reporting higher levels of depression than the average medical patient. His depression is likely to be expressed as agitation and irritability. Easily upset, Mr. Landaverde expects the worst from life and often feels dejected. He may not be a particularly cooperative patient due to his tendency to shut down rather than acknowledge shortcomings or problems. He endorses none of the coping strategies measured by this assessment. His psychological profile suggests that he is likely to benefit from psychosocial interventions to address the psychological issues that could affect his adjustment to his illness or recovery from surgery. Test results reveal that, compared to other bariatric surgery patients, there is a LOW chance that he will engage in healthy lifestyle changes to support positive surgery results, and a POOR chance of improved quality of life after surgery.     DIAGNOSTIC IMPRESSIONS:  Z68.43   Body Mass Index of 55  Z01.818 Pre-operative Exam  E66.01   Morbid Obesity  Anxiety  Dysthymia vs. Depression    SUMMARY AND RECOMMENDATIONS:  Mr. Landaverde has a long history of weight problems and is pursuing bariatric surgery at this time in an effort to improve his health and quality of life. Results of personality testing should be considered valid, and they indicate that he is experiencing anxiety, dejection, and lack of healthy coping. Testing does not identify any serious  contraindications for surgery; however, results do suggest that he is likely to benefit from psychological intervention as well as medication.
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
## 2019-06-20 NOTE — Clinical Note
I am clearing this pt despite presence of anxiety and depression due to belief that surgery benefits outweigh consequences at this time; however, I strongly recommended some mental health intervention given his history. We can discuss further at meeting if needed.

## 2019-06-20 NOTE — PROGRESS NOTES
NUTRITION NOTE    Referring Physician: Erik Blas M.D.  Reason for MNT Referral: MSD pending Gastric Sleeve    Patient presents for 2nd visit for MSD with weight at a standstill over the past month; total weight loss by making numerous dietary and lifestyle changes.     CLINICAL DATA:  38 y.o. male.    Current Weight: 267 lbs  Weight Change Since Initial Visit: 0 lbs  Ideal Body Weight: 160 lbs  Body mass index is 54.21 kg/m².    NUTRITIONAL NEEDS:  2580 x 1.2 activity factor = 3096 - 1000 for wt loss = 2096 Calories (using Rescue St. Jeor Equation)  109-131 Grams Protein (1.5-1.8 gm/kg IBW)       CURRENT DIET:  Reduced-calorie diet.  Diet Recall: Food records are present. Using Spark oni    Current diet recall:   Breakfast: skips or poptart or eggs with grits or toast with cheese or protein shake  Lunch: skips or salad with protein or sandwich with baked chips  Snack: watermelon  Dinner (6-8pm): steak with potato or tacos or spaghetti or pot roast with potatoes or hot dogs or potato with salad    Meal Pattern: Improved.  Protein supplements: whey protein powder (root beer flavor - 20 gm protein)  Snackin  Vegetables: Likes a few. Eats 2-3 times per week.  Fruits: Likes a variety. Eats almost daily.  Beverages: water - 6 cups per day but has 2L fluid restriction per day  Dining out: Monthly. Mostly fast food and restaurants. 1-2/month  Cooking at home: Daily. Mostly baked, grilled and air fryer meat, fish, starchy CHO and vegetables.    CURRENT EXERCISE:  Current exercise: Adequate - 4/wk - gym (cardio with weights - 1 hours)  Restrictions to exercise: heart rate limit (pt unsure but does have it written down)    Vitamins / Minerals / Herbs:   Vit D    Food Allergies:   Orange  shellfish  *lactose-intolerant    Social:  part-time (2-3 days per week - day time)  Alcohol: None.  Smoking: None.    ASSESSMENT:  Patient demonstrates some willingness to change lifestyle habits as evidenced by good  exercise, including protein drinks, increased vegetables and healthier cooking at home.    Doing fairly well with working on greatest challenges (starchy CHO and only eats once per day).    Barriers to Education:  none  Stage of Change:  determination and action    NUTRITION DIAGNOSIS:  Obesity related to Excessive calorie intake as evidence by BMI.  Status: Improved    PLAN:  Pt is a potential candidate for surgery.    Diet: Adjust diet plan.  Work on expanding variety of vegetables.  Work on gradually cutting back on starchy CHO in the diet.  Begin trying various protein supplements to determine preference.  5-6 meals per day.  Start shopping for bariatric vitamins & minerals.    Exercise: Maintain.    Behavior Modification: Continue to document food & activity logs daily.    Weight loss prior to surgery (5-10% TBW): 18-36 lbs    Needs additional visit(s) with RD.    Communicated nutrition plan with bariatric team.    SESSION TIME:  30 minutes

## 2019-06-25 ENCOUNTER — PATIENT MESSAGE (OUTPATIENT)
Dept: BARIATRICS | Facility: CLINIC | Age: 39
End: 2019-06-25

## 2019-07-05 ENCOUNTER — CLINICAL SUPPORT (OUTPATIENT)
Dept: BARIATRICS | Facility: CLINIC | Age: 39
End: 2019-07-05
Payer: MEDICARE

## 2019-07-05 VITALS — WEIGHT: 315 LBS | BODY MASS INDEX: 54.05 KG/M2

## 2019-07-05 PROCEDURE — 99499 UNLISTED E&M SERVICE: CPT | Mod: 95,TXP,, | Performed by: DIETITIAN, REGISTERED

## 2019-07-05 PROCEDURE — 99499 NO LOS: ICD-10-PCS | Mod: 95,TXP,, | Performed by: DIETITIAN, REGISTERED

## 2019-07-05 NOTE — PROGRESS NOTES
NUTRITION NOTE    Referring Physician: Erik Blas M.D.  Reason for MNT Referral: 3 months Medically Supervised Diet pending Gastric Sleeve    Patient presents for 3rd visit for [facility] with weight loss over the past month; total weight loss by making numerous dietary and lifestyle changes.    CLINICAL DATA:  38 y.o. male.    Current Weight: 366  lbs  Weight Change Since Initial Visit: Loss of 1  lbs  Ideal Body Weight: 160 lbs  Body mass index is 54.05 kg/m².    NUTRITIONAL NEEDS:  2580 x 1.2 activity factor = 3096 - 1000 for wt loss = 2096 Calories (using [address] Jeor Equation)  109-131 Grams Protein (1.5-1.8 gm/kg IBW)    CURRENT DIET:  Reduced-calorie diet.  Diet Recall: Food records are present.  Using [employer] oni  Breakfast: Whey protein powder shake  Lunch: Equate equivalent  premier protein or Ensure high protein -16 gms protein per serving  Dinner: Sirloin steak with mashed cauliflower  Snack: Watermelon    Diet Includes: 2 protein drinks, 1 meal and 1 healthy snack- fruit usually  Meal Pattern: Improved.  Protein Supplements: 2 per day.  Snacking: Adequate. Snacks include healthy choices.  Vegetables: Likes a variety. Eats almost daily.  Fruits: Likes a variety. Eats 2-3 times per week.  Beverages: water  Dining Out:  Reduced lately. Mostly take-out.  Cooking at home: Daily. Mostly baked and grilled meat, fish and vegetables.    CURRENT EXERCISE:  Adequate  Body squats,walking and stationary bike at least 3 times per week    Vitamins / Minerals / Herbs:   Vitamin D- OTC finished his Vitamin rx  Multivitamin    Food Allergies:   Lactose intolerant  Orange/OJ/orange oil  Shellfish    Social:  Works regular daytime shifts.  Part time  Alcohol: None.  Smoking: None.    ASSESSMENT:  Patient demonstrates all willingness to change lifestyle habits as evidenced by weight loss, good exercise, daily food logs, dietary changes, including protein drinks, reduced dining out, more food preparation at [facility],  healthier cooking at home and healthier snacking at home.    Doing well with working on greatest challenges (starchy CHO and Eating only once per day).    Barriers to Education:  none  Stage of Change:  action    NUTRITION DIAGNOSIS:  Obesity related to Excessive calorie intake as evidence by BMI.  Status: Improved    PLAN:  Pt is good candidate for surgery.    Diet: Maintain diet plan.  Limit fruit to 2 servings per day  Start shopping for bariatric vitamins & minerals.    Exercise: Maintain.    Behavior Modification: Continue to document food & activity logs daily.        Communicated nutrition plan with bariatric team.    SESSION TIME:  30 minutes

## 2019-07-08 ENCOUNTER — PATIENT MESSAGE (OUTPATIENT)
Dept: BARIATRICS | Facility: CLINIC | Age: 39
End: 2019-07-08

## 2019-07-08 ENCOUNTER — TELEPHONE (OUTPATIENT)
Dept: BARIATRICS | Facility: CLINIC | Age: 39
End: 2019-07-08

## 2019-07-10 ENCOUNTER — PATIENT MESSAGE (OUTPATIENT)
Dept: PSYCHIATRY | Facility: CLINIC | Age: 39
End: 2019-07-10

## 2019-07-31 ENCOUNTER — PATIENT MESSAGE (OUTPATIENT)
Dept: BARIATRICS | Facility: CLINIC | Age: 39
End: 2019-07-31

## 2019-08-08 ENCOUNTER — PATIENT MESSAGE (OUTPATIENT)
Dept: BARIATRICS | Facility: CLINIC | Age: 39
End: 2019-08-08

## 2019-08-08 ENCOUNTER — OFFICE VISIT (OUTPATIENT)
Dept: TRANSPLANT | Facility: CLINIC | Age: 39
End: 2019-08-08
Payer: MEDICARE

## 2019-08-08 ENCOUNTER — DOCUMENTATION ONLY (OUTPATIENT)
Dept: TRANSPLANT | Facility: CLINIC | Age: 39
End: 2019-08-08

## 2019-08-08 ENCOUNTER — LAB VISIT (OUTPATIENT)
Dept: LAB | Facility: HOSPITAL | Age: 39
End: 2019-08-08
Attending: PHYSICIAN ASSISTANT
Payer: MEDICARE

## 2019-08-08 VITALS
BODY MASS INDEX: 46.65 KG/M2 | SYSTOLIC BLOOD PRESSURE: 133 MMHG | DIASTOLIC BLOOD PRESSURE: 87 MMHG | WEIGHT: 315 LBS | HEIGHT: 69 IN | HEART RATE: 74 BPM

## 2019-08-08 DIAGNOSIS — Z95.810 S/P ICD (INTERNAL CARDIAC DEFIBRILLATOR) PROCEDURE: ICD-10-CM

## 2019-08-08 DIAGNOSIS — G47.33 OSA (OBSTRUCTIVE SLEEP APNEA): ICD-10-CM

## 2019-08-08 DIAGNOSIS — I42.0 NONISCHEMIC DILATED CARDIOMYOPATHY: ICD-10-CM

## 2019-08-08 DIAGNOSIS — E55.9 VITAMIN D DEFICIENCY: ICD-10-CM

## 2019-08-08 DIAGNOSIS — I50.22 CHRONIC SYSTOLIC CONGESTIVE HEART FAILURE: ICD-10-CM

## 2019-08-08 DIAGNOSIS — I50.41: Primary | ICD-10-CM

## 2019-08-08 DIAGNOSIS — A04.8 POSITIVE H. PYLORI TEST: ICD-10-CM

## 2019-08-08 DIAGNOSIS — E66.01 MORBID OBESITY DUE TO EXCESS CALORIES: ICD-10-CM

## 2019-08-08 DIAGNOSIS — Z95.810 IMPLANTABLE CARDIOVERTER-DEFIBRILLATOR (ICD) IN SITU: ICD-10-CM

## 2019-08-08 LAB — 25(OH)D3+25(OH)D2 SERPL-MCNC: 29 NG/ML (ref 30–96)

## 2019-08-08 PROCEDURE — 99999 PR PBB SHADOW E&M-EST. PATIENT-LVL III: ICD-10-PCS | Mod: PBBFAC,TXP,, | Performed by: INTERNAL MEDICINE

## 2019-08-08 PROCEDURE — 3075F SYST BP GE 130 - 139MM HG: CPT | Mod: CPTII,S$GLB,, | Performed by: INTERNAL MEDICINE

## 2019-08-08 PROCEDURE — 99215 OFFICE O/P EST HI 40 MIN: CPT | Mod: S$GLB,,, | Performed by: INTERNAL MEDICINE

## 2019-08-08 PROCEDURE — 3075F PR MOST RECENT SYSTOLIC BLOOD PRESS GE 130-139MM HG: ICD-10-PCS | Mod: CPTII,S$GLB,, | Performed by: INTERNAL MEDICINE

## 2019-08-08 PROCEDURE — 3079F DIAST BP 80-89 MM HG: CPT | Mod: CPTII,S$GLB,, | Performed by: INTERNAL MEDICINE

## 2019-08-08 PROCEDURE — 82306 VITAMIN D 25 HYDROXY: CPT

## 2019-08-08 PROCEDURE — 3008F BODY MASS INDEX DOCD: CPT | Mod: CPTII,S$GLB,, | Performed by: INTERNAL MEDICINE

## 2019-08-08 PROCEDURE — 3008F PR BODY MASS INDEX (BMI) DOCUMENTED: ICD-10-PCS | Mod: CPTII,S$GLB,, | Performed by: INTERNAL MEDICINE

## 2019-08-08 PROCEDURE — 3079F PR MOST RECENT DIASTOLIC BLOOD PRESSURE 80-89 MM HG: ICD-10-PCS | Mod: CPTII,S$GLB,, | Performed by: INTERNAL MEDICINE

## 2019-08-08 PROCEDURE — 99215 PR OFFICE/OUTPT VISIT, EST, LEVL V, 40-54 MIN: ICD-10-PCS | Mod: S$GLB,,, | Performed by: INTERNAL MEDICINE

## 2019-08-08 PROCEDURE — 99999 PR PBB SHADOW E&M-EST. PATIENT-LVL III: CPT | Mod: PBBFAC,TXP,, | Performed by: INTERNAL MEDICINE

## 2019-08-08 PROCEDURE — 36415 COLL VENOUS BLD VENIPUNCTURE: CPT

## 2019-08-08 RX ORDER — CARVEDILOL 25 MG/1
25 TABLET ORAL 2 TIMES DAILY
Qty: 60 TABLET | Refills: 11 | Status: SHIPPED | OUTPATIENT
Start: 2019-08-08 | End: 2019-10-28 | Stop reason: SDUPTHER

## 2019-08-08 RX ORDER — SERTRALINE HYDROCHLORIDE 100 MG/1
100 TABLET, FILM COATED ORAL DAILY
Refills: 0 | COMMUNITY
Start: 2019-06-06

## 2019-08-08 RX ORDER — DULOXETIN HYDROCHLORIDE 20 MG/1
40 CAPSULE, DELAYED RELEASE ORAL DAILY
Refills: 0 | COMMUNITY
Start: 2019-07-12 | End: 2019-10-28 | Stop reason: SDUPTHER

## 2019-08-08 NOTE — PROGRESS NOTES
Per Dr Mariee, pt is not a candidate for advanced options at this time. Current BMI 55, hemodynamically stable.   He will follow in HF section upon return.    preht file closed.

## 2019-08-08 NOTE — PROGRESS NOTES
Subjective:   Initial evaluation of heart transplant candidacy.    HPI:  Mr. Landaverde is a 38 y.o. year old Unknown male who has presents to be considered for advanced surgical options (LVAD/OHT). Patient has had heart failure since last year. Patient has been referred by Dr. Richards. Has been treated with GDMT, had repeat echo 03/16 which still demonstrated reduced LVEF, and therefore 04/30/16 underwent ICD placement. He is here after being [facility] in [place] for insurance issues. Have not seen him since 2016      Since last visit FC II III NYHA on entresto 97/103. COntinues to do well Have seen bariatric and may proceed with it      Echo January 2019    · Moderate left ventricular enlargement.  · Severely decreased left ventricular systolic function. The estimated ejection fraction is 20%  · Severe global hypokinetic wall motion.  · Grade I (mild) left ventricular diastolic dysfunction consistent with impaired relaxation.  · Moderate left atrial enlargement.  · Low normal right ventricular systolic function.  · Mild right atrial enlargement.  · Mild mitral regurgitation.  · Mild tricuspid regurgitation.  · Normal central venous pressure (3 mm Hg).  · The estimated PA systolic pressure is 32 mm Hg      2D echo with CFD  - Eccentric hypertrophy.   - Severely depressed left ventricular systolic function (EF 15-20%).   - Moderate left atrial enlargement.   - Biatrial enlargement.   - Normal right ventricular systolic function .   - The estimated PA systolic pressure is greater than 23 mmHg.   - Mild tricuspid regurgitation.   - Atrial septal aneurysm .      [facility]  AOPRES: 132/90 (104)  AOSAT: 97  FICKCI: 2.25  FICKCO: 5.62  PAPRES: 45/24 (31)  PASAT: 69  PVR: 2.31  PWPRES: 25/18 (18)  RAPRES: 10/8 (7)  RVPRES: 44/7, 7  SVR: 17.26  PWSAT: 95     CPX   - The respiratory exchange ratio (RER) was 1.13, suggesting an adequate effort.  - The breathing reserve is calculated at 33%, which is normal. Oxygen saturation with exercise  remained normal.   - The PkVO2 was 19.2 ml/kg/min which is 47% of predicted equating to a functional capacity of 5.5 METS indicating severe functional impairment.   - The anaerobic threshold (AT), which occurred at a heart rate of 125bpm, was 12.8 ml/kg/min, which is 31% of the predicted VO2 and is reduced.   - The PkVO2 Lean was 27.24 ml/kg of lean body weight/min indicating a good prognosis in heart failure.   - The VE/VCO2 decreased by -31% from rest to AT. The VE/VCO2 Irion was 22.1.  The Resting PetCO2 was 43.7.     Past Medical History:   Diagnosis Date    Allergy     Cardiomyopathy     CHF (congestive heart failure)     Depression     Hypertension     Obesity      Past Surgical History:   Procedure Laterality Date    defibrillator      FRACTURE SURGERY Left     ankle    VASECTOMY         Family History   Problem Relation Age of Onset    Hypertension Mother     No Known Problems Father     Hypertension Brother     No Known Problems Daughter     No Known Problems Son        Review of Systems   Constitution: Negative for chills, decreased appetite, diaphoresis, fever, malaise/fatigue, weight gain and weight loss.   HENT: Negative for congestion.    Eyes: Negative for blurred vision and visual disturbance.   Cardiovascular: Positive for dyspnea on exertion. Negative for chest pain, irregular heartbeat, leg swelling, near-syncope, orthopnea, palpitations, paroxysmal nocturnal dyspnea and syncope.   Respiratory: Positive for shortness of breath. Negative for cough, sleep disturbances due to breathing, snoring and wheezing.    Hematologic/Lymphatic: Negative for bleeding problem. Does not bruise/bleed easily.   Skin: Negative for poor wound healing and rash.   Musculoskeletal: Negative for arthritis, joint pain and muscle weakness.   Gastrointestinal: Negative for bloating, abdominal pain, constipation, diarrhea, hematemesis, hematochezia, melena, nausea and vomiting.   Genitourinary: Negative for  "frequency and urgency.   Neurological: Positive for excessive daytime sleepiness. Negative for difficulty with concentration, dizziness, headaches, light-headedness and weakness.   Psychiatric/Behavioral: Negative for depression. The patient does not have insomnia and is not nervous/anxious.        Objective:   Blood pressure 133/87, pulse 74, height 5' 9" (1.753 m), weight (!) 169.2 kg (373 lb 0.3 oz).body mass index is 55.09 kg/m².    Physical Exam   Constitutional: He is oriented to person, place, and time. He appears well-developed and well-nourished. No distress.   HENT:   Head: Normocephalic and atraumatic.   Nose: Nose normal.   Mouth/Throat: Oropharynx is clear and moist. No oropharyngeal exudate.   Eyes: Pupils are equal, round, and reactive to light. Conjunctivae and EOM are normal. No scleral icterus.   Neck: Normal range of motion. Neck supple. No JVD present. No tracheal deviation present. No thyromegaly present.   Cardiovascular: Normal rate, regular rhythm, S1 normal, S2 normal and normal heart sounds. Exam reveals no gallop and no friction rub.   No murmur heard.  Pulmonary/Chest: Effort normal and breath sounds normal. No respiratory distress. He has no wheezes. He has no rales. He exhibits no tenderness.   Abdominal: Soft. Bowel sounds are normal. He exhibits no distension and no mass. There is no tenderness. There is no rebound and no guarding.   Obese abdomen   Musculoskeletal: Normal range of motion. He exhibits edema (trace pitting edema ). He exhibits no tenderness.   Neurological: He is alert and oriented to person, place, and time. Coordination normal.   Skin: Skin is warm and dry. No rash noted. He is not diaphoretic. No erythema. No pallor.   Psychiatric: He has a normal mood and affect. His behavior is normal. Judgment and thought content normal.   Nursing note and vitals reviewed.      Labs:      Chemistry        Component Value Date/Time     05/08/2019 1634    K 4.1 05/08/2019 " 1634     05/08/2019 1634    CO2 26 05/08/2019 1634    BUN 8 05/08/2019 1634    CREATININE 0.9 05/08/2019 1634     05/08/2019 1634        Component Value Date/Time    CALCIUM 8.5 (L) 05/08/2019 1634    ALKPHOS 97 05/08/2019 1634    AST 16 05/08/2019 1634    ALT 18 05/08/2019 1634    BILITOT 0.2 05/08/2019 1634          Magnesium   Date Value Ref Range Status   05/08/2019 1.9 1.6 - 2.6 mg/dL Final     Lab Results   Component Value Date    WBC 9.10 05/08/2019    HGB 13.5 (L) 05/08/2019    HCT 41.5 05/08/2019    MCV 90 05/08/2019     05/08/2019     BNP   Date Value Ref Range Status   04/11/2019 71 0 - 99 pg/mL Final     Comment:     Values of less than 100 pg/ml are consistent with non-CHF populations.   01/11/2019 46 0 - 99 pg/mL Final     Comment:     Values of less than 100 pg/ml are consistent with non-CHF populations.   07/14/2016 154 (H) 0 - 99 pg/mL Final     Comment:     Values of less than 100 pg/ml are consistent with non-CHF populations.     Labs were reviewed with the patient.    Assessment:      1. Acute combined systolic and diastolic congestive heart failure, NYHA class 3    2. Nonischemic dilated cardiomyopathy    3. Implantable cardioverter-defibrillator (ICD) in situ    4. Chronic systolic congestive heart failure    5. Body mass index (BMI) of 40.0-44.9 in adult    6. S/P ICD (internal cardiac defibrillator) procedure    7. Morbid obesity due to excess calories    8. ORIN (obstructive sleep apnea)        Plan:   BMI 56 I have explained to him that this is a contraindication for heart transplant or LVAD we will continue GMT. On 97/103 of entresto    Have seen bariatric surgery and should proceed        He has been on amiodarone since 2017 due to SVT noted on ICD interrogation      I will dc amiodarone    RTC 4 months      Patient is now NYHA II ACC stage C  Recommend 2 gram sodium restriction and 1500cc fluid restriction.  Encourage physical activity with graded exercise  program.  Requested patient to weigh themselves daily, and to notify us if their weight increases by more than 3 lbs in 1 day or 5 lbs in 1 week.     Transplant Candidacy: Patient is a 38 y.o. year old male with heart failure is being seen for possible LVAD. In my opinion, he is  a marginal LVAD candidate. Patient did not meet with MCS and/or pre-transplant coordinator at the end of this visit for workup. he is scheduled for a CPX in 3 months  UNOS Patient Status  Functional Status: 80% - Normal activity with effort: some symptoms of disease  Physical Capacity: No Limitations  Working for Income: yes  If yes, working activity level: Working, Part Time vs. Full Time Unknown    Israel Mariee MD

## 2019-08-08 NOTE — LETTER
August 8, 2019        Germán Richards  [id number]  [place] [address]  Phone: [phone]  Fax: [phone]             [facility] [address] [place] [address]  Phone: [phone]   Patient: Trell Landaverde   MR Number: [id number]   YOB: 1980   Date of Visit: 8/8/2019       Dear Dr. Germán Richards    Thank you for referring Trell Landaverde to me for evaluation. Attached you will find relevant portions of my assessment and plan of care.    If you have questions, please do not hesitate to call me. I look forward to following Trell Landaverde along with you.    Sincerely,    Israel Mariee MD    Enclosure    If you would like to receive this communication electronically, please contact [email or web address] or [phone] to request [employer] access.    [employer] is a tool which provides read-only access to select patient information with whom you have a relationship. Its easy to use and provides real time access to review your patients record including encounter summaries, notes, results, and demographic information.    If you feel you have received this communication in error or would no longer like to receive these types of communications, please e-mail [email or web address]

## 2019-08-13 ENCOUNTER — TELEPHONE (OUTPATIENT)
Dept: PSYCHIATRY | Facility: CLINIC | Age: 39
End: 2019-08-13

## 2019-08-13 NOTE — TELEPHONE ENCOUNTER
"Called pt to check on him post-psych eval. He reports that he has been going to [facility] since July. He has been started on Effexor and is taking it daily ("I haven't missed a dose"). He reports that his family has seen a big positive difference in him and is happy that he is taking it. Pt went to a therapist for 6 consecutive weeks but had to stop last week due to a change in insurance; he reports he will be able to start back up at the end of August and states that he "really likes going". Pt doing well overall and is cleared for surgery.  "

## 2019-08-14 ENCOUNTER — DOCUMENTATION ONLY (OUTPATIENT)
Dept: TRANSPLANT | Facility: CLINIC | Age: 39
End: 2019-08-14

## 2019-08-14 ENCOUNTER — TELEPHONE (OUTPATIENT)
Dept: BARIATRICS | Facility: CLINIC | Age: 39
End: 2019-08-14

## 2019-08-14 DIAGNOSIS — A04.8 POSITIVE H. PYLORI TEST: ICD-10-CM

## 2019-08-14 DIAGNOSIS — Z79.899 LONG TERM USE OF DRUG: ICD-10-CM

## 2019-08-14 DIAGNOSIS — Z71.3 NUTRITIONAL COUNSELING: ICD-10-CM

## 2019-08-14 DIAGNOSIS — Z98.84 S/P LAPAROSCOPIC SLEEVE GASTRECTOMY: ICD-10-CM

## 2019-08-14 DIAGNOSIS — R73.03 PRE-DIABETES: ICD-10-CM

## 2019-08-14 DIAGNOSIS — E55.9 VITAMIN D DEFICIENCY: ICD-10-CM

## 2019-08-14 DIAGNOSIS — I50.40 COMBINED SYSTOLIC AND DIASTOLIC CONGESTIVE HEART FAILURE, NYHA CLASS 3, UNSPECIFIED CONGESTIVE HEART FAILURE CHRONICITY: ICD-10-CM

## 2019-08-14 DIAGNOSIS — Z95.810 IMPLANTABLE CARDIOVERTER-DEFIBRILLATOR (ICD) IN SITU: ICD-10-CM

## 2019-08-14 DIAGNOSIS — E66.01 MORBID OBESITY: ICD-10-CM

## 2019-08-14 DIAGNOSIS — I50.22 CHRONIC SYSTOLIC CONGESTIVE HEART FAILURE: ICD-10-CM

## 2019-08-14 DIAGNOSIS — G47.33 OSA (OBSTRUCTIVE SLEEP APNEA): ICD-10-CM

## 2019-08-14 DIAGNOSIS — I10 BENIGN ESSENTIAL HYPERTENSION: Primary | ICD-10-CM

## 2019-08-14 NOTE — TELEPHONE ENCOUNTER
Spoke with patient and scheduled preop appts/ surgery date/ post op appts. All dates and times agreed upon. Pt aware that they must have PCP clearance within 60 days of surgery- it should be dated, signed and in chart for preop appointment. Pt aware that protein liquid diet start date is 9/12/19.  Screened patient for history of UTI per protocol.   Discussed with patient to avoid antibiotics and elective procedures involving sedation/anesthesia within 30 days of surgery.  Patient instructed to call the bariatric clinic post op for any s/s of UTI.  Pt aware of all appts can be seen in my [facility] patient portal at this time and appt reminders mailed to patient's address today by RN.  Office phone and fax number given to patient for any future questions/concerns.

## 2019-08-27 ENCOUNTER — PATIENT MESSAGE (OUTPATIENT)
Dept: TRANSPLANT | Facility: CLINIC | Age: 39
End: 2019-08-27

## 2019-09-11 ENCOUNTER — OFFICE VISIT (OUTPATIENT)
Dept: BARIATRICS | Facility: CLINIC | Age: 39
End: 2019-09-11
Payer: MEDICARE

## 2019-09-11 ENCOUNTER — CLINICAL SUPPORT (OUTPATIENT)
Dept: BARIATRICS | Facility: CLINIC | Age: 39
End: 2019-09-11
Payer: MEDICARE

## 2019-09-11 ENCOUNTER — LAB VISIT (OUTPATIENT)
Dept: LAB | Facility: HOSPITAL | Age: 39
End: 2019-09-11
Payer: MEDICARE

## 2019-09-11 VITALS
DIASTOLIC BLOOD PRESSURE: 75 MMHG | BODY MASS INDEX: 46.65 KG/M2 | SYSTOLIC BLOOD PRESSURE: 129 MMHG | HEART RATE: 89 BPM | WEIGHT: 315 LBS | RESPIRATION RATE: 18 BRPM | HEIGHT: 69 IN

## 2019-09-11 DIAGNOSIS — I50.22 CHRONIC SYSTOLIC CONGESTIVE HEART FAILURE: ICD-10-CM

## 2019-09-11 DIAGNOSIS — R73.03 PRE-DIABETES: ICD-10-CM

## 2019-09-11 DIAGNOSIS — G47.33 OSA (OBSTRUCTIVE SLEEP APNEA): ICD-10-CM

## 2019-09-11 DIAGNOSIS — E66.01 MORBID OBESITY: ICD-10-CM

## 2019-09-11 DIAGNOSIS — I50.42 CHRONIC COMBINED SYSTOLIC AND DIASTOLIC CONGESTIVE HEART FAILURE, NYHA CLASS 3: ICD-10-CM

## 2019-09-11 DIAGNOSIS — I42.0 NONISCHEMIC DILATED CARDIOMYOPATHY: ICD-10-CM

## 2019-09-11 DIAGNOSIS — I10 BENIGN ESSENTIAL HYPERTENSION: ICD-10-CM

## 2019-09-11 DIAGNOSIS — E55.9 VITAMIN D DEFICIENCY: ICD-10-CM

## 2019-09-11 DIAGNOSIS — Z71.3 NUTRITIONAL COUNSELING: ICD-10-CM

## 2019-09-11 DIAGNOSIS — Z95.810 IMPLANTABLE CARDIOVERTER-DEFIBRILLATOR (ICD) IN SITU: ICD-10-CM

## 2019-09-11 DIAGNOSIS — Z95.810 S/P ICD (INTERNAL CARDIAC DEFIBRILLATOR) PROCEDURE: ICD-10-CM

## 2019-09-11 DIAGNOSIS — A04.8 POSITIVE H. PYLORI TEST: ICD-10-CM

## 2019-09-11 DIAGNOSIS — I50.40 COMBINED SYSTOLIC AND DIASTOLIC CONGESTIVE HEART FAILURE, NYHA CLASS 3, UNSPECIFIED CONGESTIVE HEART FAILURE CHRONICITY: ICD-10-CM

## 2019-09-11 LAB
ALBUMIN SERPL BCP-MCNC: 3.3 G/DL (ref 3.5–5.2)
ALP SERPL-CCNC: 108 U/L (ref 55–135)
ALT SERPL W/O P-5'-P-CCNC: 26 U/L (ref 10–44)
ANION GAP SERPL CALC-SCNC: 8 MMOL/L (ref 8–16)
AST SERPL-CCNC: 17 U/L (ref 10–40)
BASOPHILS # BLD AUTO: 0.03 K/UL (ref 0–0.2)
BASOPHILS NFR BLD: 0.3 % (ref 0–1.9)
BILIRUB SERPL-MCNC: 0.2 MG/DL (ref 0.1–1)
BUN SERPL-MCNC: 10 MG/DL (ref 6–20)
CALCIUM SERPL-MCNC: 9.4 MG/DL (ref 8.7–10.5)
CHLORIDE SERPL-SCNC: 107 MMOL/L (ref 95–110)
CO2 SERPL-SCNC: 27 MMOL/L (ref 23–29)
CREAT SERPL-MCNC: 0.8 MG/DL (ref 0.5–1.4)
DIFFERENTIAL METHOD: ABNORMAL
EOSINOPHIL # BLD AUTO: 0.1 K/UL (ref 0–0.5)
EOSINOPHIL NFR BLD: 0.5 % (ref 0–8)
ERYTHROCYTE [DISTWIDTH] IN BLOOD BY AUTOMATED COUNT: 14.6 % (ref 11.5–14.5)
EST. GFR  (AFRICAN AMERICAN): >60 ML/MIN/1.73 M^2
EST. GFR  (NON AFRICAN AMERICAN): >60 ML/MIN/1.73 M^2
ESTIMATED AVG GLUCOSE: 137 MG/DL (ref 68–131)
GLUCOSE SERPL-MCNC: 80 MG/DL (ref 70–110)
HBA1C MFR BLD HPLC: 6.4 % (ref 4–5.6)
HCT VFR BLD AUTO: 40.8 % (ref 40–54)
HGB BLD-MCNC: 13 G/DL (ref 14–18)
IMM GRANULOCYTES # BLD AUTO: 0.03 K/UL (ref 0–0.04)
IMM GRANULOCYTES NFR BLD AUTO: 0.3 % (ref 0–0.5)
LYMPHOCYTES # BLD AUTO: 3.1 K/UL (ref 1–4.8)
LYMPHOCYTES NFR BLD: 32.5 % (ref 18–48)
MCH RBC QN AUTO: 28.7 PG (ref 27–31)
MCHC RBC AUTO-ENTMCNC: 31.9 G/DL (ref 32–36)
MCV RBC AUTO: 90 FL (ref 82–98)
MONOCYTES # BLD AUTO: 1 K/UL (ref 0.3–1)
MONOCYTES NFR BLD: 10 % (ref 4–15)
NEUTROPHILS # BLD AUTO: 5.3 K/UL (ref 1.8–7.7)
NEUTROPHILS NFR BLD: 56.4 % (ref 38–73)
NRBC BLD-RTO: 0 /100 WBC
PLATELET # BLD AUTO: 243 K/UL (ref 150–350)
PMV BLD AUTO: 10.7 FL (ref 9.2–12.9)
POTASSIUM SERPL-SCNC: 4 MMOL/L (ref 3.5–5.1)
PROT SERPL-MCNC: 7.3 G/DL (ref 6–8.4)
RBC # BLD AUTO: 4.53 M/UL (ref 4.6–6.2)
SODIUM SERPL-SCNC: 142 MMOL/L (ref 136–145)
WBC # BLD AUTO: 9.47 K/UL (ref 3.9–12.7)

## 2019-09-11 PROCEDURE — 99999 PR PBB SHADOW E&M-EST. PATIENT-LVL III: CPT | Mod: PBBFAC,,, | Performed by: SURGERY

## 2019-09-11 PROCEDURE — 99499 NO LOS: ICD-10-PCS | Mod: S$GLB,,, | Performed by: DIETITIAN, REGISTERED

## 2019-09-11 PROCEDURE — 99999 PR PBB SHADOW E&M-EST. PATIENT-LVL III: ICD-10-PCS | Mod: PBBFAC,,, | Performed by: SURGERY

## 2019-09-11 PROCEDURE — 3078F DIAST BP <80 MM HG: CPT | Mod: CPTII,S$GLB,, | Performed by: SURGERY

## 2019-09-11 PROCEDURE — 99999 PR PBB SHADOW E&M-EST. PATIENT-LVL I: ICD-10-PCS | Mod: PBBFAC,,, | Performed by: DIETITIAN, REGISTERED

## 2019-09-11 PROCEDURE — 99214 OFFICE O/P EST MOD 30 MIN: CPT | Mod: S$GLB,,, | Performed by: SURGERY

## 2019-09-11 PROCEDURE — 99499 UNLISTED E&M SERVICE: CPT | Mod: S$GLB,,, | Performed by: DIETITIAN, REGISTERED

## 2019-09-11 PROCEDURE — 99214 PR OFFICE/OUTPT VISIT, EST, LEVL IV, 30-39 MIN: ICD-10-PCS | Mod: S$GLB,,, | Performed by: SURGERY

## 2019-09-11 PROCEDURE — 3008F PR BODY MASS INDEX (BMI) DOCUMENTED: ICD-10-PCS | Mod: CPTII,S$GLB,, | Performed by: SURGERY

## 2019-09-11 PROCEDURE — 99999 PR PBB SHADOW E&M-EST. PATIENT-LVL I: CPT | Mod: PBBFAC,,, | Performed by: DIETITIAN, REGISTERED

## 2019-09-11 PROCEDURE — 80053 COMPREHEN METABOLIC PANEL: CPT

## 2019-09-11 PROCEDURE — 83036 HEMOGLOBIN GLYCOSYLATED A1C: CPT

## 2019-09-11 PROCEDURE — 3008F BODY MASS INDEX DOCD: CPT | Mod: CPTII,S$GLB,, | Performed by: SURGERY

## 2019-09-11 PROCEDURE — 3078F PR MOST RECENT DIASTOLIC BLOOD PRESSURE < 80 MM HG: ICD-10-PCS | Mod: CPTII,S$GLB,, | Performed by: SURGERY

## 2019-09-11 PROCEDURE — 3074F SYST BP LT 130 MM HG: CPT | Mod: CPTII,S$GLB,, | Performed by: SURGERY

## 2019-09-11 PROCEDURE — 36415 COLL VENOUS BLD VENIPUNCTURE: CPT

## 2019-09-11 PROCEDURE — 3074F PR MOST RECENT SYSTOLIC BLOOD PRESSURE < 130 MM HG: ICD-10-PCS | Mod: CPTII,S$GLB,, | Performed by: SURGERY

## 2019-09-11 PROCEDURE — 85025 COMPLETE CBC W/AUTO DIFF WBC: CPT

## 2019-09-11 RX ORDER — URSODIOL 500 MG/1
TABLET, FILM COATED ORAL
Qty: 90 TABLET | Refills: 1 | Status: SHIPPED | OUTPATIENT
Start: 2019-09-11 | End: 2019-11-20 | Stop reason: SDUPTHER

## 2019-09-11 RX ORDER — FAMOTIDINE 10 MG/ML
20 INJECTION INTRAVENOUS ONCE
Status: CANCELLED | OUTPATIENT
Start: 2019-09-11 | End: 2019-09-11

## 2019-09-11 RX ORDER — POLYETHYLENE GLYCOL 3350 17 G/17G
17 POWDER, FOR SOLUTION ORAL DAILY
Qty: 510 G | Refills: 3 | Status: SHIPPED | OUTPATIENT
Start: 2019-09-11

## 2019-09-11 RX ORDER — HYDROCODONE BITARTRATE AND ACETAMINOPHEN 7.5; 325 MG/15ML; MG/15ML
15 SOLUTION ORAL 4 TIMES DAILY PRN
Qty: 473 ML | Refills: 0 | Status: SHIPPED | OUTPATIENT
Start: 2019-09-11 | End: 2019-10-28

## 2019-09-11 RX ORDER — HEPARIN SODIUM 5000 [USP'U]/ML
5000 INJECTION, SOLUTION INTRAVENOUS; SUBCUTANEOUS ONCE
Status: CANCELLED | OUTPATIENT
Start: 2019-09-11 | End: 2019-09-11

## 2019-09-11 RX ORDER — PROMETHAZINE HYDROCHLORIDE 6.25 MG/5ML
SYRUP ORAL
Qty: 280 ML | Refills: 0 | Status: SHIPPED | OUTPATIENT
Start: 2019-09-11 | End: 2022-06-24

## 2019-09-11 RX ORDER — OMEPRAZOLE 40 MG/1
40 CAPSULE, DELAYED RELEASE ORAL EVERY MORNING
Qty: 30 CAPSULE | Refills: 2 | Status: SHIPPED | OUTPATIENT
Start: 2019-09-11 | End: 2019-11-20 | Stop reason: SDUPTHER

## 2019-09-11 RX ORDER — SODIUM CITRATE AND CITRIC ACID MONOHYDRATE 334; 500 MG/5ML; MG/5ML
30 SOLUTION ORAL ONCE
Status: CANCELLED | OUTPATIENT
Start: 2019-09-11 | End: 2019-09-11

## 2019-09-11 RX ORDER — METOCLOPRAMIDE HYDROCHLORIDE 5 MG/ML
10 INJECTION INTRAMUSCULAR; INTRAVENOUS ONCE
Status: CANCELLED | OUTPATIENT
Start: 2019-09-11 | End: 2019-09-11

## 2019-09-11 RX ORDER — ONDANSETRON 8 MG/1
8 TABLET, ORALLY DISINTEGRATING ORAL EVERY 6 HOURS PRN
Qty: 30 TABLET | Refills: 0 | Status: SHIPPED | OUTPATIENT
Start: 2019-09-11

## 2019-09-11 NOTE — PROGRESS NOTES
Pt will use equate version of premier protein, high protein ensure, and protein powder with water protein shakes.  If using protein powder, reminded pt of mixing with water for days 1 and 2, by day 3 may try using skim, 1% milk or unsweetened almond/soy milk.  By Day 4, may use RTD protein shakes as tolerated  Pt needs to purchase the following vitamins and minerals to start taking once discharged from hospital  Multivitamin with 18 mg iron  B-complex 50 mg thiamin  Calcium citrate 500 mg three times per day  Vitamin B-12 500 mcg sublingually  Reviewed dosage and timing of vitamin/mineral guidelines.  Reviewed nutritional guidelines for protein and fluid requirements for week 1 and week 2 post-surgery.  Handout provided to log protein and fluid daily.  1 ounce medicine cups provided for patient to measure fluid intake after surgery.  Reviewed common nutritional concerns and prevention tips after bariatric surgery  Pt verbalized understanding of information provided with appropriate questions and comments.

## 2019-09-11 NOTE — PROGRESS NOTES
Subjective:  The patient is a 38 y.o. obese male who presents for pre op for gastric sleeve surgery.   Pt states he has not always been overweight, states around the age of 26 he started to gain weight. In the past twelve years has gained over 100 lbs about 150. The patient has tried exercising 3x's, and Weight Watchers, vegan diet . Highest weight 383.  All workup has been reviewed in clinic today and there is nothing on the review that would prevent us from proceeding with surgery.  Pt with severe CHF.  All questions were answered in clinic today prior to leaving.  Good functyional status.  Exercises high intensity without issue.  Body mass index is 54.56 kg/m².       Patient Active Problem List    Diagnosis Date Noted    Body mass index (BMI) of 40.0-44.9 in adult 05/08/2019    Congestive heart failure     ORIN (obstructive sleep apnea) 05/30/2017    S/P ICD (internal cardiac defibrillator) procedure 06/13/2016    Depression 06/13/2016    Morbid obesity due to excess calories 06/13/2016    Implantable cardioverter-defibrillator (ICD) in situ 04/29/2016    Nonischemic dilated cardiomyopathy 10/28/2015    Benign essential hypertension 10/28/2015    Combined systolic and diastolic congestive heart failure 10/28/2015     Past Medical History:   Diagnosis Date    Allergy     Cardiomyopathy     CHF (congestive heart failure)     Depression     Hypertension     Obesity       Past Surgical History:   Procedure Laterality Date    defibrillator      FRACTURE SURGERY Left     ankle    VASECTOMY          (Not in a hospital admission)  Review of patient's allergies indicates:   Allergen Reactions    Iodine Swelling    Latex Itching    Orange Swelling    Orange juice Swelling    Orange oil Swelling    Shellfish containing products Swelling      Social History     Tobacco Use    Smoking status: Never Smoker    Smokeless tobacco: Never Used   Substance Use Topics    Alcohol use: Yes      Family History  "  Problem Relation Age of Onset    Hypertension Mother     No Known Problems Father     Hypertension Brother     No Known Problems Daughter     No Known Problems Son         Review of Systems  Constitutional: negative for anorexia, chills and fatigue  Eyes: negative for icterus, irritation and redness  Respiratory: negative for cough and dyspnea on exertion  Cardiovascular: negative for chest pain and chest pressure/discomfort  Gastrointestinal: negative for abdominal pain, change in bowel habits, constipation and diarrhea  Musculoskeletal:negative for arthralgias and back pain  Neurological: negative for coordination problems and dizziness  Behavioral/Psych: negative for anxiety and bad mood    Objective:  Vital signs in last 24 hours:  Vitals:    09/11/19 1344   Resp: 18   Weight: (!) 167.6 kg (369 lb 7.9 oz)   Height: 5' 9" (1.753 m)          Weight History Current Weight Total Weight Loss   5/8/2019 373 -373       General appearance: alert, appears stated age and cooperative  Head: Normocephalic, without obvious abnormality, atraumatic  Eyes: negative findings: lids and lashes normal and conjunctivae and sclerae normal  Neck: supple, symmetrical, trachea midline and thyroid not enlarged, symmetric, no tenderness/mass/nodules  Lungs: Non labored breathing  Heart: regular rate and rhythm  Abdomen: soft, non-tender  Extremities: extremities normal, atraumatic, no cyanosis or edema  Skin: Skin color, texture, turgor normal. No rashes or lesions  Neurologic: Grossly normal    Data Review:  Psych: Cleared  Nutrition: Cleared  PCP: Cleared  CXR: WNL  Cards: Cleared, Patient states that cards states they may want to admit him the day prior to surgery for eval.  Will contact in regards to this.  Echo:   · Moderate left ventricular enlargement.  · Severely decreased left ventricular systolic function. The estimated ejection fraction is 20%  · Severe global hypokinetic wall motion.  · Grade I (mild) left ventricular " diastolic dysfunction consistent with impaired relaxation.  · Moderate left atrial enlargement.  · Low normal right ventricular systolic function.  · Mild right atrial enlargement.  · Mild mitral regurgitation.  · Mild tricuspid regurgitation.  · Normal central venous pressure (3 mm Hg).  The estimated PA systolic pressure is 32 mm Hg  EKG: Normal sinus rhythm  Minimal voltage criteria for LVH, may be normal variant  Nonspecific T wave abnormality  Abnormal ECG  Stress: Moderate functional impairment associated with a normal breathing reserve, normal oxygen stauration, an adequate effort, and a borderline reduced AT. These findings are indicative of functional impairment secondary to deconditioning.  · The patient reported no symptoms during the stress test.  · The test was stopped secondary to fatigue.  · Arrhythmias during stress: rare PVCs.  · There was no ST segment deviation noted during stress.  The EKG portion of this study is negative for myocardial ischemia.  Labs: No abnormalities that would prevent proceeding with surgery.    Assessment/Plan:  Morbid obesity with failure of conservative therapy.    The patient was informed that risks include, but are not limited to: death, leak, obstruction, bleeding, and sepsis. Any of these could require further surgery. Other risks include DVT, PE, pneumonia, wound dehiscence, hernia, wound infection, the need for dilatations and the inability to lose appropriate weight and keep it off.     We discussed that our goal is to ameliorate his medical problems and not to obtain a specific body mass index. He understands the risks and benefits and wishes to proceed with the procedure. He has signed a consent form.       Erik Blas MD

## 2019-09-14 ENCOUNTER — PATIENT MESSAGE (OUTPATIENT)
Dept: BARIATRICS | Facility: CLINIC | Age: 39
End: 2019-09-14

## 2019-09-25 ENCOUNTER — ANESTHESIA EVENT (OUTPATIENT)
Dept: SURGERY | Facility: HOSPITAL | Age: 39
DRG: 620 | End: 2019-09-25
Payer: MEDICARE

## 2019-09-25 ENCOUNTER — TELEPHONE (OUTPATIENT)
Dept: BARIATRICS | Facility: CLINIC | Age: 39
End: 2019-09-25

## 2019-09-25 ENCOUNTER — DOCUMENTATION ONLY (OUTPATIENT)
Dept: ELECTROPHYSIOLOGY | Facility: CLINIC | Age: 39
End: 2019-09-25

## 2019-09-25 NOTE — PROGRESS NOTES
Patient has been identified as having an implanted cardiac rhythm device (CRD); the implanted device is a SJM ICD.  Scheduled for Gastrectomy/sleeve.    No noted pacer dependency.     CXR shows single lead ICD 5/2019. EKG 5/2019 shows SR.         DEFIBRILLATORS/NON-DEPENDENT ANY LOCATION    Per protocol,a MAGNET should be applied directly over the implanted device during entire surgical procedure when electrocautery will be used.    If no electrocautery is planned, magnet application is not needed.        For additional questions, please contact the Arrhythmia Department at Ext [phone].

## 2019-09-25 NOTE — PRE-PROCEDURE INSTRUCTIONS
"Preop instructions:     NPO solids/ milk products after midnight or clears up to 2 hours before sx START TIME -  DRINK 8 oz of water prior to arrival to hospital.   shower instructions, directions, leave all valuables at home, medication instructions for PM prior & am of procedure explained. Patient stated an understanding.      Patient denies any side effects or issues with anesthesia or sedation.                                                                                                                                    Patient does not know arrival time. Explained that this information comes from the surgeons office and if they have not heard from them by 2 pm, to call office. Patient stated an understanding.    PT REPORTS THAT HE "COULD" BE ADMITTED PM PRIOR TO SX PER CARDIOLOGY, HOWEVER HE HAS NOT RECEIVED A CALL OR ANY INFORMATION CONFIRMING THAT PLAN.    MSG SENT TO BARIATRIC SX DEPT REGARDING PSBL ADMIT.    "

## 2019-09-25 NOTE — TELEPHONE ENCOUNTER
----- Message from Apolinar Cardona sent at 9/25/2019  2:09 PM CDT -----  Contact: Pt  Type:  Needs Medical Advice    Who Called: The Pt just missed a call from your office and would like a call back please.    Best Call Back Number: [phone]

## 2019-09-25 NOTE — TELEPHONE ENCOUNTER
Notified patient of arrival time to the [facility] at 0800 with expected surgery start time 1000 on 9/26/19.   Instructed patient regarding pre-op instructions including npo status, showering and preop medications to hold/take per anesthesia/preop.  Instructed patient on the s/s of dehydration and for patient to call at the first sign of dehydration.  Informed patient that someone from bariatrics will be call them 1 week post op to review diet/fluid intake and to ensure adequate hydration.   Pt verbalized understanding. Pt given office phone number for any additional questions/concerns. Also reminded not to take ABx or schedule elective procedures for 30 days after your surgery unless checking with the bariatric clinic first.  He verbalized understanding.

## 2019-09-26 ENCOUNTER — ANESTHESIA (OUTPATIENT)
Dept: SURGERY | Facility: HOSPITAL | Age: 39
DRG: 620 | End: 2019-09-26
Payer: MEDICARE

## 2019-09-26 ENCOUNTER — HOSPITAL ENCOUNTER (INPATIENT)
Facility: HOSPITAL | Age: 39
LOS: 1 days | Discharge: HOME | DRG: 620 | End: 2019-09-27
Attending: SURGERY | Admitting: SURGERY
Payer: MEDICARE

## 2019-09-26 PROCEDURE — 37000008 HC ANESTHESIA 1ST 15 MINUTES: Performed by: SURGERY

## 2019-09-26 PROCEDURE — 99900035 HC TECH TIME PER 15 MIN (STAT)

## 2019-09-26 PROCEDURE — 25000003 PHARM REV CODE 250: Performed by: STUDENT IN AN ORGANIZED HEALTH CARE EDUCATION/TRAINING PROGRAM

## 2019-09-26 PROCEDURE — 88305 TISSUE EXAM BY PATHOLOGIST: CPT | Mod: 26,,, | Performed by: PATHOLOGY

## 2019-09-26 PROCEDURE — S0028 INJECTION, FAMOTIDINE, 20 MG: HCPCS | Performed by: SURGERY

## 2019-09-26 PROCEDURE — 63600175 PHARM REV CODE 636 W HCPCS: Performed by: STUDENT IN AN ORGANIZED HEALTH CARE EDUCATION/TRAINING PROGRAM

## 2019-09-26 PROCEDURE — 27800903 OPTIME MED/SURG SUP & DEVICES OTHER IMPLANTS: Performed by: SURGERY

## 2019-09-26 PROCEDURE — 36000711: Performed by: SURGERY

## 2019-09-26 PROCEDURE — 27201423 OPTIME MED/SURG SUP & DEVICES STERILE SUPPLY: Performed by: SURGERY

## 2019-09-26 PROCEDURE — 12000002 HC ACUTE/MED SURGE SEMI-PRIVATE ROOM

## 2019-09-26 PROCEDURE — 37000009 HC ANESTHESIA EA ADD 15 MINS: Performed by: SURGERY

## 2019-09-26 PROCEDURE — 88305 TISSUE SPECIMEN TO PATHOLOGY - SURGERY: ICD-10-PCS | Mod: 26,,, | Performed by: PATHOLOGY

## 2019-09-26 PROCEDURE — 43775 PR LAP, GAST RESTRICT PROC, LONGITUDINAL GASTRECTOMY: ICD-10-PCS | Mod: ,,, | Performed by: SURGERY

## 2019-09-26 PROCEDURE — 43775 LAP SLEEVE GASTRECTOMY: CPT | Mod: ,,, | Performed by: SURGERY

## 2019-09-26 PROCEDURE — C1751 CATH, INF, PER/CENT/MIDLINE: HCPCS | Performed by: STUDENT IN AN ORGANIZED HEALTH CARE EDUCATION/TRAINING PROGRAM

## 2019-09-26 PROCEDURE — 71000039 HC RECOVERY, EACH ADD'L HOUR: Performed by: SURGERY

## 2019-09-26 PROCEDURE — 88305 TISSUE EXAM BY PATHOLOGIST: CPT | Performed by: PATHOLOGY

## 2019-09-26 PROCEDURE — 94770 HC EXHALED C02 TEST: CPT

## 2019-09-26 PROCEDURE — 63600175 PHARM REV CODE 636 W HCPCS: Performed by: SURGERY

## 2019-09-26 PROCEDURE — 71000033 HC RECOVERY, INTIAL HOUR: Performed by: SURGERY

## 2019-09-26 PROCEDURE — 25000003 PHARM REV CODE 250: Performed by: SURGERY

## 2019-09-26 PROCEDURE — D9220A PRA ANESTHESIA: Mod: ,,, | Performed by: ANESTHESIOLOGY

## 2019-09-26 PROCEDURE — 94761 N-INVAS EAR/PLS OXIMETRY MLT: CPT

## 2019-09-26 PROCEDURE — 36620 INSERTION CATHETER ARTERY: CPT | Mod: 59,,, | Performed by: ANESTHESIOLOGY

## 2019-09-26 PROCEDURE — 36000710: Performed by: SURGERY

## 2019-09-26 PROCEDURE — D9220A PRA ANESTHESIA: ICD-10-PCS | Mod: ,,, | Performed by: ANESTHESIOLOGY

## 2019-09-26 PROCEDURE — 36620 ARTERIAL: ICD-10-PCS | Mod: 59,,, | Performed by: ANESTHESIOLOGY

## 2019-09-26 PROCEDURE — 27201037 HC PRESSURE MONITORING SET UP

## 2019-09-26 PROCEDURE — S0028 INJECTION, FAMOTIDINE, 20 MG: HCPCS | Performed by: STUDENT IN AN ORGANIZED HEALTH CARE EDUCATION/TRAINING PROGRAM

## 2019-09-26 DEVICE — SEAMGUARD ESCHELON 60 MM.: Type: IMPLANTABLE DEVICE | Site: ABDOMEN | Status: FUNCTIONAL

## 2019-09-26 RX ORDER — NALOXONE HCL 0.4 MG/ML
0.02 VIAL (ML) INJECTION
Status: DISCONTINUED | OUTPATIENT
Start: 2019-09-26 | End: 2019-09-27

## 2019-09-26 RX ORDER — EPINEPHRINE 1 MG/ML
INJECTION, SOLUTION INTRACARDIAC; INTRAMUSCULAR; INTRAVENOUS; SUBCUTANEOUS
Status: DISCONTINUED | OUTPATIENT
Start: 2019-09-26 | End: 2019-09-26

## 2019-09-26 RX ORDER — SODIUM CHLORIDE 9 MG/ML
INJECTION, SOLUTION INTRAVENOUS CONTINUOUS
Status: DISCONTINUED | OUTPATIENT
Start: 2019-09-26 | End: 2019-09-27

## 2019-09-26 RX ORDER — ASPIRIN 81 MG/1
81 TABLET ORAL DAILY
Status: DISCONTINUED | OUTPATIENT
Start: 2019-09-27 | End: 2019-09-27 | Stop reason: HOSPADM

## 2019-09-26 RX ORDER — BUPIVACAINE HYDROCHLORIDE 2.5 MG/ML
INJECTION, SOLUTION EPIDURAL; INFILTRATION; INTRACAUDAL
Status: DISCONTINUED | OUTPATIENT
Start: 2019-09-26 | End: 2019-09-26 | Stop reason: HOSPADM

## 2019-09-26 RX ORDER — PROPOFOL 10 MG/ML
VIAL (ML) INTRAVENOUS
Status: DISCONTINUED | OUTPATIENT
Start: 2019-09-26 | End: 2019-09-26

## 2019-09-26 RX ORDER — LIDOCAINE HCL/PF 100 MG/5ML
SYRINGE (ML) INTRAVENOUS
Status: DISCONTINUED | OUTPATIENT
Start: 2019-09-26 | End: 2019-09-26

## 2019-09-26 RX ORDER — SPIRONOLACTONE 25 MG/1
25 TABLET ORAL DAILY
Status: DISCONTINUED | OUTPATIENT
Start: 2019-09-27 | End: 2019-09-27 | Stop reason: HOSPADM

## 2019-09-26 RX ORDER — METOCLOPRAMIDE HYDROCHLORIDE 5 MG/ML
10 INJECTION INTRAMUSCULAR; INTRAVENOUS ONCE
Status: COMPLETED | OUTPATIENT
Start: 2019-09-26 | End: 2019-09-26

## 2019-09-26 RX ORDER — FUROSEMIDE 40 MG/1
40 TABLET ORAL 2 TIMES DAILY
Status: DISCONTINUED | OUTPATIENT
Start: 2019-09-26 | End: 2019-09-27 | Stop reason: HOSPADM

## 2019-09-26 RX ORDER — SODIUM CHLORIDE 0.9 % (FLUSH) 0.9 %
10 SYRINGE (ML) INJECTION
Status: DISCONTINUED | OUTPATIENT
Start: 2019-09-26 | End: 2019-09-26 | Stop reason: HOSPADM

## 2019-09-26 RX ORDER — DULOXETIN HYDROCHLORIDE 20 MG/1
40 CAPSULE, DELAYED RELEASE ORAL DAILY
Status: DISCONTINUED | OUTPATIENT
Start: 2019-09-27 | End: 2019-09-27 | Stop reason: HOSPADM

## 2019-09-26 RX ORDER — DEXAMETHASONE SODIUM PHOSPHATE 4 MG/ML
INJECTION, SOLUTION INTRA-ARTICULAR; INTRALESIONAL; INTRAMUSCULAR; INTRAVENOUS; SOFT TISSUE
Status: DISCONTINUED | OUTPATIENT
Start: 2019-09-26 | End: 2019-09-26

## 2019-09-26 RX ORDER — FAMOTIDINE 10 MG/ML
20 INJECTION INTRAVENOUS ONCE
Status: COMPLETED | OUTPATIENT
Start: 2019-09-26 | End: 2019-09-26

## 2019-09-26 RX ORDER — KETAMINE HYDROCHLORIDE 10 MG/ML
INJECTION, SOLUTION INTRAMUSCULAR; INTRAVENOUS
Status: DISCONTINUED | OUTPATIENT
Start: 2019-09-26 | End: 2019-09-26

## 2019-09-26 RX ORDER — ROCURONIUM BROMIDE 10 MG/ML
INJECTION, SOLUTION INTRAVENOUS
Status: DISCONTINUED | OUTPATIENT
Start: 2019-09-26 | End: 2019-09-26

## 2019-09-26 RX ORDER — FENTANYL CITRATE 50 UG/ML
INJECTION, SOLUTION INTRAMUSCULAR; INTRAVENOUS
Status: DISCONTINUED | OUTPATIENT
Start: 2019-09-26 | End: 2019-09-26

## 2019-09-26 RX ORDER — MIDAZOLAM HYDROCHLORIDE 1 MG/ML
INJECTION, SOLUTION INTRAMUSCULAR; INTRAVENOUS
Status: DISCONTINUED | OUTPATIENT
Start: 2019-09-26 | End: 2019-09-26

## 2019-09-26 RX ORDER — SERTRALINE HYDROCHLORIDE 100 MG/1
100 TABLET, FILM COATED ORAL DAILY
Status: DISCONTINUED | OUTPATIENT
Start: 2019-09-27 | End: 2019-09-27 | Stop reason: HOSPADM

## 2019-09-26 RX ORDER — HYDROCODONE BITARTRATE AND ACETAMINOPHEN 7.5; 325 MG/15ML; MG/15ML
15 SOLUTION ORAL EVERY 4 HOURS PRN
Status: DISCONTINUED | OUTPATIENT
Start: 2019-09-27 | End: 2019-09-27 | Stop reason: HOSPADM

## 2019-09-26 RX ORDER — ENOXAPARIN SODIUM 100 MG/ML
40 INJECTION SUBCUTANEOUS EVERY 12 HOURS
Status: DISCONTINUED | OUTPATIENT
Start: 2019-09-26 | End: 2019-09-27 | Stop reason: HOSPADM

## 2019-09-26 RX ORDER — ACETAMINOPHEN 10 MG/ML
INJECTION, SOLUTION INTRAVENOUS
Status: DISCONTINUED | OUTPATIENT
Start: 2019-09-26 | End: 2019-09-26

## 2019-09-26 RX ORDER — KETAMINE HCL IN 0.9 % NACL 50 MG/5 ML
SYRINGE (ML) INTRAVENOUS
Status: DISCONTINUED | OUTPATIENT
Start: 2019-09-26 | End: 2019-09-26

## 2019-09-26 RX ORDER — HYDROMORPHONE HCL IN 0.9% NACL 6 MG/30 ML
PATIENT CONTROLLED ANALGESIA SYRINGE INTRAVENOUS CONTINUOUS
Status: DISCONTINUED | OUTPATIENT
Start: 2019-09-26 | End: 2019-09-27

## 2019-09-26 RX ORDER — SUCCINYLCHOLINE CHLORIDE 20 MG/ML
INJECTION INTRAMUSCULAR; INTRAVENOUS
Status: DISCONTINUED | OUTPATIENT
Start: 2019-09-26 | End: 2019-09-26

## 2019-09-26 RX ORDER — PHENYLEPHRINE HYDROCHLORIDE 10 MG/ML
INJECTION INTRAVENOUS
Status: DISCONTINUED | OUTPATIENT
Start: 2019-09-26 | End: 2019-09-26

## 2019-09-26 RX ORDER — PANTOPRAZOLE SODIUM 40 MG/10ML
40 INJECTION, POWDER, LYOPHILIZED, FOR SOLUTION INTRAVENOUS 2 TIMES DAILY
Status: DISCONTINUED | OUTPATIENT
Start: 2019-09-26 | End: 2019-09-26

## 2019-09-26 RX ORDER — FAMOTIDINE 10 MG/ML
20 INJECTION INTRAVENOUS 2 TIMES DAILY
Status: DISCONTINUED | OUTPATIENT
Start: 2019-09-26 | End: 2019-09-27 | Stop reason: HOSPADM

## 2019-09-26 RX ORDER — SODIUM CITRATE AND CITRIC ACID MONOHYDRATE 334; 500 MG/5ML; MG/5ML
30 SOLUTION ORAL ONCE
Status: COMPLETED | OUTPATIENT
Start: 2019-09-26 | End: 2019-09-26

## 2019-09-26 RX ORDER — ETOMIDATE 2 MG/ML
INJECTION INTRAVENOUS
Status: DISCONTINUED | OUTPATIENT
Start: 2019-09-26 | End: 2019-09-26

## 2019-09-26 RX ORDER — ACETAMINOPHEN 10 MG/ML
1000 INJECTION, SOLUTION INTRAVENOUS ONCE
Status: COMPLETED | OUTPATIENT
Start: 2019-09-26 | End: 2019-09-26

## 2019-09-26 RX ORDER — LIDOCAINE HYDROCHLORIDE AND EPINEPHRINE 10; 10 MG/ML; UG/ML
INJECTION, SOLUTION INFILTRATION; PERINEURAL
Status: DISCONTINUED | OUTPATIENT
Start: 2019-09-26 | End: 2019-09-26 | Stop reason: HOSPADM

## 2019-09-26 RX ORDER — SODIUM CHLORIDE 9 MG/ML
INJECTION, SOLUTION INTRAVENOUS CONTINUOUS PRN
Status: DISCONTINUED | OUTPATIENT
Start: 2019-09-26 | End: 2019-09-26

## 2019-09-26 RX ORDER — HEPARIN SODIUM 5000 [USP'U]/ML
5000 INJECTION, SOLUTION INTRAVENOUS; SUBCUTANEOUS ONCE
Status: COMPLETED | OUTPATIENT
Start: 2019-09-26 | End: 2019-09-26

## 2019-09-26 RX ORDER — ONDANSETRON 2 MG/ML
4 INJECTION INTRAMUSCULAR; INTRAVENOUS EVERY 6 HOURS PRN
Status: DISCONTINUED | OUTPATIENT
Start: 2019-09-26 | End: 2019-09-27 | Stop reason: HOSPADM

## 2019-09-26 RX ORDER — CARVEDILOL 25 MG/1
25 TABLET ORAL 2 TIMES DAILY
Status: DISCONTINUED | OUTPATIENT
Start: 2019-09-26 | End: 2019-09-27 | Stop reason: HOSPADM

## 2019-09-26 RX ORDER — HYDROMORPHONE HYDROCHLORIDE 1 MG/ML
0.2 INJECTION, SOLUTION INTRAMUSCULAR; INTRAVENOUS; SUBCUTANEOUS EVERY 5 MIN PRN
Status: DISCONTINUED | OUTPATIENT
Start: 2019-09-26 | End: 2019-09-26 | Stop reason: HOSPADM

## 2019-09-26 RX ORDER — TAMSULOSIN HYDROCHLORIDE 0.4 MG/1
0.4 CAPSULE ORAL DAILY
Status: DISCONTINUED | OUTPATIENT
Start: 2019-09-26 | End: 2019-09-27 | Stop reason: HOSPADM

## 2019-09-26 RX ADMIN — EPINEPHRINE 10 MCG: 1 INJECTION, SOLUTION, CONCENTRATE INTRAVENOUS at 11:09

## 2019-09-26 RX ADMIN — HYDROMORPHONE HYDROCHLORIDE 0.2 MG: 1 INJECTION, SOLUTION INTRAMUSCULAR; INTRAVENOUS; SUBCUTANEOUS at 02:09

## 2019-09-26 RX ADMIN — HYDROMORPHONE HYDROCHLORIDE 0.2 MG: 1 INJECTION, SOLUTION INTRAMUSCULAR; INTRAVENOUS; SUBCUTANEOUS at 12:09

## 2019-09-26 RX ADMIN — ONDANSETRON 4 MG: 2 INJECTION INTRAMUSCULAR; INTRAVENOUS at 06:09

## 2019-09-26 RX ADMIN — METOCLOPRAMIDE 10 MG: 5 INJECTION, SOLUTION INTRAMUSCULAR; INTRAVENOUS at 08:09

## 2019-09-26 RX ADMIN — PHENYLEPHRINE HYDROCHLORIDE 100 MCG: 10 INJECTION INTRAVENOUS at 11:09

## 2019-09-26 RX ADMIN — HYDROMORPHONE HYDROCHLORIDE 0.2 MG: 1 INJECTION, SOLUTION INTRAMUSCULAR; INTRAVENOUS; SUBCUTANEOUS at 01:09

## 2019-09-26 RX ADMIN — EPINEPHRINE 5 MCG: 1 INJECTION, SOLUTION, CONCENTRATE INTRAVENOUS at 11:09

## 2019-09-26 RX ADMIN — CEFAZOLIN SODIUM 20 ML: 2 SOLUTION INTRAVENOUS at 11:09

## 2019-09-26 RX ADMIN — ROCURONIUM BROMIDE 5 MG: 10 INJECTION, SOLUTION INTRAVENOUS at 10:09

## 2019-09-26 RX ADMIN — Medication 20 MG: at 11:09

## 2019-09-26 RX ADMIN — ROCURONIUM BROMIDE 30 MG: 10 INJECTION, SOLUTION INTRAVENOUS at 11:09

## 2019-09-26 RX ADMIN — SODIUM CITRATE AND CITRIC ACID MONOHYDRATE 30 ML: 500; 334 SOLUTION ORAL at 08:09

## 2019-09-26 RX ADMIN — SODIUM CHLORIDE: 0.9 INJECTION, SOLUTION INTRAVENOUS at 02:09

## 2019-09-26 RX ADMIN — DEXAMETHASONE SODIUM PHOSPHATE 8 MG: 4 INJECTION, SOLUTION INTRAMUSCULAR; INTRAVENOUS at 11:09

## 2019-09-26 RX ADMIN — ETOMIDATE 22 MG: 2 INJECTION, SOLUTION INTRAVENOUS at 10:09

## 2019-09-26 RX ADMIN — EPINEPHRINE 0.02 MCG/KG/MIN: 1 INJECTION INTRAMUSCULAR; INTRAVENOUS; SUBCUTANEOUS at 11:09

## 2019-09-26 RX ADMIN — TAMSULOSIN HYDROCHLORIDE 0.4 MG: 0.4 CAPSULE ORAL at 05:09

## 2019-09-26 RX ADMIN — FAMOTIDINE 20 MG: 10 INJECTION, SOLUTION INTRAVENOUS at 08:09

## 2019-09-26 RX ADMIN — HEPARIN SODIUM 5000 UNITS: 5000 INJECTION, SOLUTION INTRAVENOUS; SUBCUTANEOUS at 08:09

## 2019-09-26 RX ADMIN — Medication 30 MG: at 10:09

## 2019-09-26 RX ADMIN — PROPOFOL 25 MG: 10 INJECTION, EMULSION INTRAVENOUS at 10:09

## 2019-09-26 RX ADMIN — CARVEDILOL 25 MG: 25 TABLET, FILM COATED ORAL at 08:09

## 2019-09-26 RX ADMIN — SUGAMMADEX 600 MG: 100 INJECTION, SOLUTION INTRAVENOUS at 12:09

## 2019-09-26 RX ADMIN — FUROSEMIDE 40 MG: 40 TABLET ORAL at 05:09

## 2019-09-26 RX ADMIN — ENOXAPARIN SODIUM 40 MG: 100 INJECTION SUBCUTANEOUS at 08:09

## 2019-09-26 RX ADMIN — SODIUM CHLORIDE: 0.9 INJECTION, SOLUTION INTRAVENOUS at 10:09

## 2019-09-26 RX ADMIN — ACETAMINOPHEN 1000 MG: 10 INJECTION, SOLUTION INTRAVENOUS at 11:09

## 2019-09-26 RX ADMIN — Medication: at 02:09

## 2019-09-26 RX ADMIN — ROCURONIUM BROMIDE 20 MG: 10 INJECTION, SOLUTION INTRAVENOUS at 11:09

## 2019-09-26 RX ADMIN — SUCCINYLCHOLINE CHLORIDE 200 MG: 20 INJECTION, SOLUTION INTRAMUSCULAR; INTRAVENOUS at 10:09

## 2019-09-26 RX ADMIN — ROCURONIUM BROMIDE 45 MG: 10 INJECTION, SOLUTION INTRAVENOUS at 10:09

## 2019-09-26 RX ADMIN — MIDAZOLAM HYDROCHLORIDE 1 MG: 1 INJECTION, SOLUTION INTRAMUSCULAR; INTRAVENOUS at 10:09

## 2019-09-26 RX ADMIN — FENTANYL CITRATE 100 MCG: 50 INJECTION, SOLUTION INTRAMUSCULAR; INTRAVENOUS at 10:09

## 2019-09-26 RX ADMIN — LIDOCAINE HYDROCHLORIDE 100 MG: 20 INJECTION, SOLUTION INTRAVENOUS at 10:09

## 2019-09-26 RX ADMIN — ACETAMINOPHEN 1000 MG: 10 INJECTION, SOLUTION INTRAVENOUS at 08:09

## 2019-09-26 NOTE — ANESTHESIA PROCEDURE NOTES
Arterial    Diagnosis: Obesity    Patient location during procedure: done in OR  Procedure start time: 9/26/2019 11:00 AM  Timeout: 9/26/2019 11:00 AM  Procedure end time: 9/26/2019 11:00 AM    Staffing  Authorizing Provider: May Dudley MD  Performing Provider: Justice He MD    Anesthesiologist was present at the time of the procedure.    Preanesthetic Checklist  Completed: patient identified, site marked, surgical consent, pre-op evaluation, timeout performed, IV checked, risks and benefits discussed, monitors and equipment checked and anesthesia consent givenArterial  Skin Prep: chlorhexidine gluconate  Local Infiltration: none  Orientation: left  Location: radial  Catheter placement by Ultrasound guidance. Heme positive aspiration all ports.  Vessel Caliber: medium, patent, compressibility normal  Needle advanced into vessel with real time Ultrasound guidance.Insertion Attempts: 2  Assessment  Dressing: secured with tape and tegaderm

## 2019-09-26 NOTE — BRIEF OP NOTE
[facility]  Brief Operative Note    SUMMARY     Surgery Date: 9/26/2019     Surgeon(s) and Role:     * Erik Blas Jr., MD - Primary     * Jay Medina MD - Resident - Assisting     * Scott Hollis MD - Resident - Assisting      Pre-op Diagnosis:  Benign essential hypertension [I10]  ORIN (obstructive sleep apnea) [G47.33]  Nutritional counseling [Z71.3]  BMI 50.0-59.9, adult [Z68.43]  Implantable cardioverter-defibrillator (ICD) in situ [Z95.810]  Combined systolic and diastolic congestive heart failure, NYHA class 3, unspecified congestive heart failure chronicity [I50.40]  Positive H. pylori test [A04.8]  Vitamin D deficiency [E55.9]  Chronic systolic congestive heart failure [I50.22]  Morbid obesity [E66.01]  Pre-diabetes [R73.03]    Post-op Diagnosis:  Post-Op Diagnosis Codes:     * Benign essential hypertension [I10]     * ORIN (obstructive sleep apnea) [G47.33]     * Nutritional counseling [Z71.3]     * BMI 50.0-59.9, adult [Z68.43]     * Implantable cardioverter-defibrillator (ICD) in situ [Z95.810]     * Combined systolic and diastolic congestive heart failure, NYHA class 3, unspecified congestive heart failure chronicity [I50.40]     * Positive H. pylori test [A04.8]     * Vitamin D deficiency [E55.9]     * Chronic systolic congestive heart failure [I50.22]     * Morbid obesity [E66.01]     * Pre-diabetes [R73.03]    Procedure(s) (LRB):  GASTRECTOMY, SLEEVE, LAPAROSCOPIC, with intraop EGD 80781 (N/A)  EGD (ESOPHAGOGASTRODUODENOSCOPY) (N/A)    Anesthesia: General    Description of Procedure: Laparoscopic sleeve gastrectomy over 42Fr bougie  Intra-op EGD    Description of the findings of the procedure: Satisfactory intra-op EGD    Estimated Blood Loss: Minimal         Specimens:   Specimen (12h ago, onward)     Start     Ordered    09/26/19 1154  Specimen to Pathology - Surgery  Once     Comments:  Pre-op Diagnosis: Benign essential hypertension [I10]ORIN (obstructive sleep  apnea) [G47.33]Nutritional counseling [Z71.3]BMI 50.0-59.9, adult [Z68.43]Implantable cardioverter-defibrillator (ICD) in situ [Z95.810]Combined systolic and diastolic congestive heart failure, NYHA class 3, unspecified congestive heart failure chronicity [I50.40]Positive H. pylori test [A04.8]Vitamin D deficiency [E55.9]Chronic systolic congestive heart failure [I50.22]Morbid obesity [E66.01]Pre-diabetes [R73.03]Procedure(s):GASTRECTOMY, SLEEVE, LAPAROSCOPIC, with intraop EGD 07916 Number of specimens: 1Name of specimens: 1. Portion of stomach- perm.      09/26/19 7523

## 2019-09-26 NOTE — CARE UPDATE
Rapid Response Respiratory Therapy ETCO2 Check     Time of visit: 1640     Code Status: No Order   : 1980  Age: 38 y.o.  Weight:   Wt Readings from Last 1 Encounters:   19 (!) 158 kg (348 lb 5.2 oz)     Sex: male  Race: Black or    Bed: 3/[facility] B:   MRN: [id number]    SITUATION     Evaluated patient for: ETCO2 Compliance     BACKGROUND     Patient has a past medical history of Allergy, Cardiomyopathy, CHF (congestive heart failure), Depression, Hypertension, and Obesity.    ASSESSMENT     Is the ETCO2 monitor on? (Yes/No)  yes   Is the patient wearing a cannula? (Yes/No) yes  Are ETCO2 orders placed? (Yes/No) yes  Is the patient on a PCA pump? (Yes/No) yes  ETCO2 monitored: ETCO2 (mmHg): 57 mmHg  O2 Device/Concentration:   Pulse:  Respiratory rate:  Temperature: Temp: 97.3 °F (36.3 °C) BP: BP: (!) 151/83 SpO2:   Level of Consciousness: Level of Consciousness (AVPU): alert  Respiratory Effort:    Expansion/Accessory Muscle Usage:    All Lung Field Breath Sounds:    Most recent blood gas: No results for input(s): PH, PCO2, PO2, HCO3, POCSATURATED, BE in the last 72 hours.  Ambu at bedside:      INTERVENTIONS/RECOMMENDATIONS     Pt wearing and is compliant      PHYSICIAN ESCALATION     Physician Escalation (Yes/No):      Care discussed with:   Discussed plan of care primary RT,          FOLLOW-UP     Please call back the Rapid Response RT, Guerline Awan, RRT at x [phone] for any questions or concerns.

## 2019-09-26 NOTE — OP NOTE
DATE OF PROCEDURE: 09/26/2019   SERVICE: Bariatric Surgery.   Surgeon(s) and Role:     * Erik Blas Jr., MD - Primary     * Jay Medina MD - Resident - Assisting     * Scott Hollis MD - Resident - Assisting  PREOPERATIVE DIAGNOSES: Morbid obesity with a Body mass index is 51.44 kg/m².   along with benign hypertension, obstructive sleep apnea and combined systolic and diastolic congestive heart failure, NYHA class   POSTOPERATIVE DIAGNOSES:Same  PROCEDURE: Laparoscopic sleeve gastrectomy and EGD.  ANESTHESIA: General endotracheal and local.   DESCRIPTION OF PROCEDURE: The patient was taken to the Operating Room, placed under general anesthesia, prepped and draped in sterile fashion. At this time,   incision was made approximately 15 cm from xiphoid and 5 cm to the left of   midline after infiltrating with local anesthetic. Using Optiview trocar,   intraabdominal access was obtained under direct visualization without difficulty   and pneumoperitoneum was obtained. Further ports were then placed including   bilateral anterior axillary subcostal 5 mm ports and midclavicular subcostal 5   mm port on the right and a second 12 port approximately 15 cm from xiphoid just   to right of midline. These were all placed after infiltrating with local   anesthetic and under direct visualization. Once the ports were placed, the   patient was placed in steep reverse Trendelenburg. A liver retractor was   placed. The hiatus was then examined. No hernia was noted. Once this was   complete, we turned attention to the sleeve itself. An area on the greater   curve approximately 6 cm proximal from the pylorus was identified and using a   Harmonic scalpel, the gastrocolic ligament was opened, exposing the lesser sac.   We continued to take down attachments along the greater curve including the   short gastrics to the angle of His, freeing the lateral part of the stomach. A   42-Indian bougie was then guided by Anesthesia  into the stomach and from the   laparoscopic view guided along the lesser curve. Once the bougie was in   position along the lesser curve, we began the sleeve with a green load stapler   with SeamGuard at the area 6 cm proximal from the pylorus using the bougie as a   guide on the lesser curve and fired the green load stapler beginning the sleeve.   Further staple loads, blue with SeamGuard were fired along the bougie on the   lesser curve towards the angle of His, completely freeing the lateral part of   the stomach. Once this was complete, the stomach was removed from the incision   approximately 15 cm from xiphoid just to right of midline after it was slightly   incising the skin and stretching the fascia with a Graciela. The specimen was   removed and passed off the table with ease. At this time, an 0 Vicryl suture   was then used to close the fascial defect on a suture passer device under direct   visualization. The suture was placed in figure-of-8 fashion, however, it was   not tied down at this time, the patient was laid flat. The bougie was removed.   Attention then turned to an EGD. The scope was placed in the oropharynx,   guided down the esophagus and into the sleeve through the sleeve and into the   antrum with ease. At this time, the area was infiltrated with air and we slowly   pulled the scope back inspecting the sleeve itself. The staple line showed no   signs of bleeding or other abnormalities. The sleeve itself was in good   condition with no abnormalities, no twisting or obstruction and nice uniform   size. After inspection, we suctioned all the air from the antrum and sleeve and   removed the scope under direct visualization, turned our attention back to the   laparoscopic view. We inspected the staple line, no signs of bleeding or other   abnormalities from the staple line. The liver retractor was removed. The ports   were removed under direct visualization. The suture was then placed in the    fascia of the incision 15 cm from xiphoid just to right of midline, was tied   down closing the fascia of this incision and the incision was irrigated   copiously. At this time, the skin of all five port sites was closed with 4-0   Monocryl suture in subcuticular fashion. Mastisol and Steri-Strips were placed.   The patient was allowed to awake from general anesthesia, transferred to bed   for transfer to Recovery.   COMPLICATIONS: None.   SPONGE COUNT: Correct.   BLOOD LOSS: 15 mL.   FLUIDS: Per Anesthesia.   BLOOD GIVEN: None.   DRAINS: None.   SPECIMENS: Stomach.   CONDITION OF THE PATIENT: Good.   I was present for the entire procedure.

## 2019-09-26 NOTE — INTERVAL H&P NOTE
The patient has been examined and the H&P has been reviewed:    I concur with the findings and no changes have occurred since H&P was written.    Anesthesia/Surgery risks, benefits and alternative options discussed and understood by patient/family.          Active Hospital Problems    Diagnosis  POA    Body mass index (BMI) of 40.0-44.9 in adult [Z68.41]  Not Applicable      Resolved Hospital Problems   No resolved problems to display.

## 2019-09-26 NOTE — NURSING TRANSFER
Nursing Transfer Note      9/26/2019     Transfer To: 553b    Transfer via bed    Transfer with  to O2, cardiac monitor    Transported by transport    Medicines sent: PCA dilaudid    Chart send with patient: Yes    Notified: spouse    Patient reassessed at: 9/26/19    Upon arrival to floor:

## 2019-09-26 NOTE — NURSING
Pt arrived to room via stretcher. Pt rates pain as tolerable. Pt denies nausea. PCA and  ETCO2 running. Pt lap sites clean dry and intact. Pt oriented to room and call system. Will continue to monitor.

## 2019-09-26 NOTE — TRANSFER OF CARE
"Anesthesia Transfer of Care Note    Patient: Trell Landaverde    Procedure(s) Performed: Procedure(s) (LRB):  GASTRECTOMY, SLEEVE, LAPAROSCOPIC, with intraop EGD 86143 (N/A)  EGD (ESOPHAGOGASTRODUODENOSCOPY) (N/A)    Patient location: PACU    Anesthesia Type: general    Transport from OR: Transported from OR on 6-10 L/min O2 by face mask with adequate spontaneous ventilation    Post pain: adequate analgesia    Post assessment: no apparent anesthetic complications    Post vital signs: stable    Level of consciousness: awake and alert    Nausea/Vomiting: no nausea/vomiting    Complications: none    Transfer of care protocol was followed      Last vitals:   Visit Vitals  /79 (BP Location: Left arm, Patient Position: Lying)   Pulse 83   Temp 36.2 °C (97.2 °F) (Temporal)   Resp 17   Ht 5' 9" (1.753 m)   Wt (!) 158 kg (348 lb 5.2 oz)   SpO2 97%   BMI 51.44 kg/m²     "

## 2019-09-27 VITALS
HEART RATE: 85 BPM | OXYGEN SATURATION: 94 % | DIASTOLIC BLOOD PRESSURE: 70 MMHG | TEMPERATURE: 97 F | BODY MASS INDEX: 46.65 KG/M2 | HEIGHT: 69 IN | WEIGHT: 315 LBS | SYSTOLIC BLOOD PRESSURE: 124 MMHG | RESPIRATION RATE: 18 BRPM

## 2019-09-27 LAB
ANION GAP SERPL CALC-SCNC: 11 MMOL/L (ref 8–16)
BASOPHILS # BLD AUTO: 0.01 K/UL (ref 0–0.2)
BASOPHILS NFR BLD: 0.1 % (ref 0–1.9)
BUN SERPL-MCNC: 8 MG/DL (ref 6–20)
CALCIUM SERPL-MCNC: 8.6 MG/DL (ref 8.7–10.5)
CHLORIDE SERPL-SCNC: 98 MMOL/L (ref 95–110)
CO2 SERPL-SCNC: 28 MMOL/L (ref 23–29)
CREAT SERPL-MCNC: 0.8 MG/DL (ref 0.5–1.4)
DIFFERENTIAL METHOD: ABNORMAL
EOSINOPHIL # BLD AUTO: 0 K/UL (ref 0–0.5)
EOSINOPHIL NFR BLD: 0 % (ref 0–8)
ERYTHROCYTE [DISTWIDTH] IN BLOOD BY AUTOMATED COUNT: 13.6 % (ref 11.5–14.5)
EST. GFR  (AFRICAN AMERICAN): >60 ML/MIN/1.73 M^2
EST. GFR  (NON AFRICAN AMERICAN): >60 ML/MIN/1.73 M^2
GLUCOSE SERPL-MCNC: 105 MG/DL (ref 70–110)
HCT VFR BLD AUTO: 40.6 % (ref 40–54)
HGB BLD-MCNC: 13 G/DL (ref 14–18)
IMM GRANULOCYTES # BLD AUTO: 0.02 K/UL (ref 0–0.04)
IMM GRANULOCYTES NFR BLD AUTO: 0.2 % (ref 0–0.5)
LYMPHOCYTES # BLD AUTO: 1.2 K/UL (ref 1–4.8)
LYMPHOCYTES NFR BLD: 13.1 % (ref 18–48)
MAGNESIUM SERPL-MCNC: 1.8 MG/DL (ref 1.6–2.6)
MCH RBC QN AUTO: 28.7 PG (ref 27–31)
MCHC RBC AUTO-ENTMCNC: 32 G/DL (ref 32–36)
MCV RBC AUTO: 90 FL (ref 82–98)
MONOCYTES # BLD AUTO: 0.4 K/UL (ref 0.3–1)
MONOCYTES NFR BLD: 5 % (ref 4–15)
NEUTROPHILS # BLD AUTO: 7.1 K/UL (ref 1.8–7.7)
NEUTROPHILS NFR BLD: 81.6 % (ref 38–73)
NRBC BLD-RTO: 0 /100 WBC
PHOSPHATE SERPL-MCNC: 3.8 MG/DL (ref 2.7–4.5)
PLATELET # BLD AUTO: 273 K/UL (ref 150–350)
PMV BLD AUTO: 10.8 FL (ref 9.2–12.9)
POTASSIUM SERPL-SCNC: 3.6 MMOL/L (ref 3.5–5.1)
RBC # BLD AUTO: 4.53 M/UL (ref 4.6–6.2)
SODIUM SERPL-SCNC: 137 MMOL/L (ref 136–145)
WBC # BLD AUTO: 8.75 K/UL (ref 3.9–12.7)

## 2019-09-27 PROCEDURE — S0028 INJECTION, FAMOTIDINE, 20 MG: HCPCS | Performed by: STUDENT IN AN ORGANIZED HEALTH CARE EDUCATION/TRAINING PROGRAM

## 2019-09-27 PROCEDURE — 83735 ASSAY OF MAGNESIUM: CPT

## 2019-09-27 PROCEDURE — 84100 ASSAY OF PHOSPHORUS: CPT

## 2019-09-27 PROCEDURE — 80048 BASIC METABOLIC PNL TOTAL CA: CPT

## 2019-09-27 PROCEDURE — 94761 N-INVAS EAR/PLS OXIMETRY MLT: CPT

## 2019-09-27 PROCEDURE — 63600175 PHARM REV CODE 636 W HCPCS: Performed by: STUDENT IN AN ORGANIZED HEALTH CARE EDUCATION/TRAINING PROGRAM

## 2019-09-27 PROCEDURE — 25000003 PHARM REV CODE 250: Performed by: STUDENT IN AN ORGANIZED HEALTH CARE EDUCATION/TRAINING PROGRAM

## 2019-09-27 PROCEDURE — 36415 COLL VENOUS BLD VENIPUNCTURE: CPT

## 2019-09-27 PROCEDURE — 85025 COMPLETE CBC W/AUTO DIFF WBC: CPT

## 2019-09-27 RX ADMIN — TAMSULOSIN HYDROCHLORIDE 0.4 MG: 0.4 CAPSULE ORAL at 10:09

## 2019-09-27 RX ADMIN — HYDROCODONE BITARTRATE AND ACETAMINOPHEN 15 ML: 7.5; 325 SOLUTION ORAL at 12:09

## 2019-09-27 RX ADMIN — DULOXETINE HYDROCHLORIDE 40 MG: 20 CAPSULE, DELAYED RELEASE ORAL at 10:09

## 2019-09-27 RX ADMIN — SERTRALINE HYDROCHLORIDE 100 MG: 100 TABLET ORAL at 10:09

## 2019-09-27 RX ADMIN — FUROSEMIDE 40 MG: 40 TABLET ORAL at 10:09

## 2019-09-27 RX ADMIN — CARVEDILOL 25 MG: 25 TABLET, FILM COATED ORAL at 10:09

## 2019-09-27 RX ADMIN — SPIRONOLACTONE 25 MG: 25 TABLET, FILM COATED ORAL at 10:09

## 2019-09-27 RX ADMIN — PROMETHAZINE HYDROCHLORIDE 6.25 MG: 25 INJECTION INTRAMUSCULAR; INTRAVENOUS at 12:09

## 2019-09-27 RX ADMIN — ENOXAPARIN SODIUM 40 MG: 100 INJECTION SUBCUTANEOUS at 10:09

## 2019-09-27 RX ADMIN — FAMOTIDINE 20 MG: 10 INJECTION, SOLUTION INTRAVENOUS at 11:09

## 2019-09-27 NOTE — SUBJECTIVE & OBJECTIVE
Interval History:   No acute events overnight, patient was kept NPO pain was managed with PCA. No fever, nausea, vomit or tachycardia, stable Bp. Voiding, no Bms.     Medications:  Continuous Infusions:  Scheduled Meds:   aspirin  81 mg Oral Daily    carvedilol  25 mg Oral BID    DULoxetine  40 mg Oral Daily    enoxparin  40 mg Subcutaneous Q12H    famotidine (PF)  20 mg Intravenous BID    furosemide  40 mg Oral BID    sertraline  100 mg Oral Daily    spironolactone  25 mg Oral Daily    tamsulosin  0.4 mg Oral Daily     PRN Meds:hydrocodone-apap 7.5-325 MG/15 ML, ondansetron, promethazine (PHENERGAN) IVPB     Review of patient's allergies indicates:   Allergen Reactions    Iodine Swelling    Latex Itching    Orange Swelling    Orange juice Swelling    Orange oil Swelling    Shellfish containing products Swelling     Objective:     Vital Signs (Most Recent):  Temp: 97.8 °F (36.6 °C) (09/27/19 0041)  Pulse: 77 (09/27/19 0321)  Resp: 20 (09/27/19 0041)  BP: 136/79 (09/27/19 0041)  SpO2: 96 % (09/27/19 0041) Vital Signs (24h Range):  Temp:  [97.2 °F (36.2 °C)-97.8 °F (36.6 °C)] 97.8 °F (36.6 °C)  Pulse:  [76-88] 77  Resp:  [17-24] 20  SpO2:  [93 %-100 %] 96 %  BP: (131-155)/() 136/79  Arterial Line BP: (140-153)/(72-82) 150/82     Weight: (!) 158 kg (348 lb 5.2 oz)  Body mass index is 51.44 kg/m².    Intake/Output - Last 3 Shifts       09/25 0700 - 09/26 0659 09/26 0700 - 09/27 0659 09/27 0700 - 09/28 0659    P.O.  125     I.V. (mL/kg)  500 (3.2)     Total Intake(mL/kg)  625 (4)     Urine (mL/kg/hr)  125     Total Output  125     Net  +500            Urine Occurrence  3 x           Physical Exam   Constitutional: He appears well-developed.   Eyes: Pupils are equal, round, and reactive to light. Conjunctivae are normal.   Neck: Normal range of motion. Neck supple.   Cardiovascular: Normal rate, regular rhythm and normal heart sounds.   Pulmonary/Chest: Effort normal and breath sounds normal. No  stridor. No respiratory distress.   Abdominal: Soft. Bowel sounds are normal. He exhibits no distension. There is no tenderness.   Incisions c/d/i   Musculoskeletal: Normal range of motion.   Neurological: He is alert.   Skin: Skin is warm.       Significant Labs:  CBC:   Recent Labs   Lab 09/27/19 0319   WBC 8.75   RBC 4.53*   HGB 13.0*   HCT 40.6      MCV 90   MCH 28.7   MCHC 32.0     BMP:   Recent Labs   Lab 09/27/19 0319         K 3.6   CL 98   CO2 28   BUN 8   CREATININE 0.8   CALCIUM 8.6*   MG 1.8       Significant Diagnostics:  I have reviewed and interpreted all pertinent imaging results/findings within the past 24 hours.

## 2019-09-27 NOTE — ASSESSMENT & PLAN NOTE
38 y.o male POD 1 Gastric Sleeve, PMH of CHF     - Water protocol  - If tolerated advance to Bariatric CLD  - D/C IVF  - D/C PCA  - Encourage ambulation   - Encourage IS   - Replace lytes PRN   - Lovenox, SCDs.    Dispo: if diet is tolerated and has adequate pain control

## 2019-09-27 NOTE — PROGRESS NOTES
[facility]  General Surgery  Progress Note    Subjective:     History of Present Illness:  No notes on file    Post-Op Info:  Procedure(s) (LRB):  GASTRECTOMY, SLEEVE, LAPAROSCOPIC, with intraop EGD 15123 (N/A)  EGD (ESOPHAGOGASTRODUODENOSCOPY) (N/A)   1 Day Post-Op     Interval History:   No acute events overnight, patient was kept NPO pain was managed with PCA. No fever, nausea, vomit or tachycardia, stable Bp. Voiding, no Bms.     Medications:  Continuous Infusions:  Scheduled Meds:   aspirin  81 mg Oral Daily    carvedilol  25 mg Oral BID    DULoxetine  40 mg Oral Daily    enoxparin  40 mg Subcutaneous Q12H    famotidine (PF)  20 mg Intravenous BID    furosemide  40 mg Oral BID    sertraline  100 mg Oral Daily    spironolactone  25 mg Oral Daily    tamsulosin  0.4 mg Oral Daily     PRN Meds:hydrocodone-apap 7.5-325 MG/15 ML, ondansetron, promethazine (PHENERGAN) IVPB     Review of patient's allergies indicates:   Allergen Reactions    Iodine Swelling    Latex Itching    Orange Swelling    Orange juice Swelling    Orange oil Swelling    Shellfish containing products Swelling     Objective:     Vital Signs (Most Recent):  Temp: 97.8 °F (36.6 °C) (09/27/19 0041)  Pulse: 77 (09/27/19 0321)  Resp: 20 (09/27/19 0041)  BP: 136/79 (09/27/19 0041)  SpO2: 96 % (09/27/19 0041) Vital Signs (24h Range):  Temp:  [97.2 °F (36.2 °C)-97.8 °F (36.6 °C)] 97.8 °F (36.6 °C)  Pulse:  [76-88] 77  Resp:  [17-24] 20  SpO2:  [93 %-100 %] 96 %  BP: (131-155)/() 136/79  Arterial Line BP: (140-153)/(72-82) 150/82     Weight: (!) 158 kg (348 lb 5.2 oz)  Body mass index is 51.44 kg/m².    Intake/Output - Last 3 Shifts       09/25 0700 - 09/26 0659 09/26 0700 - 09/27 0659 09/27 0700 - 09/28 0659    P.O.  125     I.V. (mL/kg)  500 (3.2)     Total Intake(mL/kg)  625 (4)     Urine (mL/kg/hr)  125     Total Output  125     Net  +500            Urine Occurrence  3 x           Physical Exam   Constitutional:  He appears well-developed.   Eyes: Pupils are equal, round, and reactive to light. Conjunctivae are normal.   Neck: Normal range of motion. Neck supple.   Cardiovascular: Normal rate, regular rhythm and normal heart sounds.   Pulmonary/Chest: Effort normal and breath sounds normal. No stridor. No respiratory distress.   Abdominal: Soft. Bowel sounds are normal. He exhibits no distension. There is no tenderness.   Incisions c/d/i   Musculoskeletal: Normal range of motion.   Neurological: He is alert.   Skin: Skin is warm.       Significant Labs:  CBC:   Recent Labs   Lab 09/27/19 0319   WBC 8.75   RBC 4.53*   HGB 13.0*   HCT 40.6      MCV 90   MCH 28.7   MCHC 32.0     BMP:   Recent Labs   Lab 09/27/19 0319         K 3.6   CL 98   CO2 28   BUN 8   CREATININE 0.8   CALCIUM 8.6*   MG 1.8       Significant Diagnostics:  I have reviewed and interpreted all pertinent imaging results/findings within the past 24 hours.    Assessment/Plan:     Body mass index (BMI) of 40.0-44.9 in adult  38 y.o male POD 1 Gastric Sleeve, PMH of CHF     - Water protocol  - If tolerated advance to Bariatric CLD  - D/C IVF  - D/C PCA  - Encourage ambulation   - Encourage IS   - Replace lytes PRN   - Lovenox, SCDs.    Dispo: if diet is tolerated and has adequate pain control         Scott Hollis MD  General Surgery  [facility]

## 2019-09-27 NOTE — PLAN OF CARE
09/27/19 1212   Post-Acute Status   Post-Acute Authorization Other   Other Status No Post-Acute Service Needs   This SW in communication with  and medical team. SW will continue to follow for discharge needs and offer support as needed.    No SW needs identified at this time.    Chaya Flowers LMSW  [facility]  [id number]

## 2019-09-27 NOTE — NURSING
Patient ambulated in hallway to employees elevated to private elevates and back to his room tolerated well .

## 2019-09-27 NOTE — HPI
The patient is a 38 y.o. obese male who presents for pre op for gastric sleeve surgery.   Pt states he has not always been overweight, states around the age of 26 he started to gain weight. In the past twelve years has gained over 100 lbs about 150. The patient has tried exercising 3x's, and Weight Watchers, vegan diet . Highest weight 383.  All workup has been reviewed in clinic today and there is nothing on the review that would prevent us from proceeding with surgery.  Pt with severe CHF.  All questions were answered in clinic today prior to leaving.  Good functyional status.  Exercises high intensity without issue.  Body mass index is 54.56 kg/m²

## 2019-09-27 NOTE — DISCHARGE SUMMARY
[facility]  General Surgery  Discharge Summary      Patient Name: Trell Landaverde  MRN: [id number]  Admission Date: 9/26/2019  Hospital Length of Stay: 1 days  Discharge Date and Time:  09/27/2019 2:20 PM  Attending Physician: Erik Blas Jr.,*   Discharging Provider: Scott Hollis MD  Primary Care Provider: Germán Richards MD    HPI:   The patient is a 38 y.o. obese male who presents for pre op for gastric sleeve surgery.   Pt states he has not always been overweight, states around the age of 26 he started to gain weight. In the past twelve years has gained over 100 lbs about 150. The patient has tried exercising 3x's, and Weight Watchers, vegan diet . Highest weight 383.  All workup has been reviewed in clinic today and there is nothing on the review that would prevent us from proceeding with surgery.  Pt with severe CHF.  All questions were answered in clinic today prior to leaving.  Good functyional status.  Exercises high intensity without issue.  Body mass index is 54.56 kg/m²     Procedure(s) (LRB):  GASTRECTOMY, SLEEVE, LAPAROSCOPIC, with intraop EGD 61733 (N/A)  EGD (ESOPHAGOGASTRODUODENOSCOPY) (N/A)      Indwelling Lines/Drains at time of discharge:   Lines/Drains/Airways     None               Hospital Course: Pt underwent gastric sleeve on 9/26/2019. See operative note for procedure details. Tolerated procedure well. Post-operative course was uncomplicated. All post-operative milestones were met without delay. He was tolerating diet, ambulating, voiding spontaneously, and his pain was well controlled. Patient was instructed and educated on discharge instructions    Consults:     Significant Diagnostic Studies: Labs:   BMP:   Recent Labs   Lab 09/27/19  0319         K 3.6   CL 98   CO2 28   BUN 8   CREATININE 0.8   CALCIUM 8.6*   MG 1.8       Pending Diagnostic Studies:     None        Final Active Diagnoses:    Diagnosis Date Noted POA    PRINCIPAL  PROBLEM:  Body mass index (BMI) of 40.0-44.9 in adult [Z68.41] 05/08/2019 Not Applicable      Problems Resolved During this Admission:      Discharged Condition: good    Disposition: Home or Self Care    Follow Up:  Follow-up Information     Erik Blas Jr, MD In 2 weeks.    Specialties:  General Surgery, Bariatrics  Why:  For wound re-check  Contact information:  [address]  [facility] [address]  [phone]                 Patient Instructions:      Diet clear liquid     No driving until:   Scheduling Instructions: Off pain medications     Lifting restrictions   Scheduling Instructions: Do not lift more than 10lbs in 6 weeks     Other restrictions (specify):   Scheduling Instructions: Do not wet incision for 48 hours after surgery, may shower after that time.  At that time, wash gently with warm soap and water at least once a day.  Do not scrub hard.  Do not soak incision in water for 2 weeks.     Notify your health care provider if you experience any of the following:  increased confusion or weakness     Notify your health care provider if you experience any of the following:  persistent dizziness, light-headedness, or visual disturbances     Notify your health care provider if you experience any of the following:  worsening rash     Notify your health care provider if you experience any of the following:  severe persistent headache     Notify your health care provider if you experience any of the following:  difficulty breathing or increased cough     Notify your health care provider if you experience any of the following:  redness, tenderness, or signs of infection (pain, swelling, redness, odor or green/yellow discharge around incision site)     Notify your health care provider if you experience any of the following:  severe uncontrolled pain     Notify your health care provider if you experience any of the following:  persistent nausea and vomiting or diarrhea     Notify your health care  provider if you experience any of the following:  temperature >100.4     Medications:  Reconciled Home Medications:      Medication List      CONTINUE taking these medications    albuterol 0.63 mg/3 mL Nebu  Commonly known as:  ACCUNEB  Take 0.63 mg by nebulization every 6 (six) hours as needed.     aspirin 81 MG EC tablet  Commonly known as:  ECOTRIN  Take 81 mg by mouth once daily.     carvedilol 25 MG tablet  Commonly known as:  COREG  Take 1 tablet (25 mg total) by mouth 2 (two) times daily.     DULoxetine 20 MG capsule  Commonly known as:  CYMBALTA  Take 40 mg by mouth once daily.     ergocalciferol 50,000 unit Cap  Commonly known as:  ERGOCALCIFEROL  Take 1 capsule (50,000 Units total) by mouth twice a week.     fluticasone-salmeterol 100-50 mcg/dose 100-50 mcg/dose diskus inhaler  Commonly known as:  ADVAIR  Inhale 1 puff into the lungs as needed.     furosemide 40 MG tablet  Commonly known as:  LASIX  Take by mouth 2 (two) times daily. Take a tablet and 1/2 twice a day     hydrocodone-apap 7.5-325 MG/15 ML oral solution  Commonly known as:  HYCET  Take 15 mLs by mouth 4 (four) times daily as needed for Pain.     omeprazole 40 MG capsule  Commonly known as:  PRILOSEC  Take 1 capsule (40 mg total) by mouth every morning. Open capsule and take with apple sauce     ondansetron 8 MG Tbdl  Commonly known as:  ZOFRAN-ODT  Dissolve 1 tablet (8 mg total) by mouth every 6 (six) hours as needed.     polyethylene glycol 17 gram/dose powder  Commonly known as:  GLYCOLAX  Mix 1 capful (17 g) into liquid and take by mouth once daily.     promethazine 6.25 mg/5 mL syrup  Commonly known as:  PHENERGAN  Take 1 - 2 teaspoonful (5-10mls) by mouth every 6 hours for 7 days     sacubitril-valsartan  mg per tablet  Commonly known as:  ENTRESTO  Take 1 tablet by mouth 2 (two) times daily.     sertraline 100 MG tablet  Commonly known as:  ZOLOFT  Take 100 mg by mouth once daily.     spironolactone 25 MG tablet  Commonly known  as:  ALDACTONE  Take 25 mg by mouth once daily.     tamsulosin 0.4 mg Cap  Commonly known as:  FLOMAX  Take 0.4 mg by mouth once daily.     ursodiol 500 MG tablet  Commonly known as:  LE FORTE  Take one tablet by mouth daily for gall bladder.     zolpidem 10 mg Tab  Commonly known as:  AMBIEN  Take 10 mg by mouth nightly as needed.          Time spent on the discharge of patient: 25 minutes    Scott Hollis MD  General Surgery  [facility]

## 2019-09-27 NOTE — PLAN OF CARE
09/27/19 1458   Final Note   Assessment Type Final Discharge Note   Anticipated Discharge Disposition Home

## 2019-09-27 NOTE — PLAN OF CARE
PCP- DR. TALISHA DELCID    PT HAS A RIDE HOME AND FAMILY SUPPORT WITH WIFE     PHARMACY- [facility] [address] [place] [address]    Coverage Name [employer] LIND (SPECIAL NEEDS PLAN) Auth Phone     Employer Group   Group Number [id number]   Subscriber Name TAE PEARSON Subscriber Number [id number]   Subscriber Date of Birth 1980 09/27/19 1325   Discharge Assessment   Assessment Type Discharge Planning Assessment   Confirmed/corrected address and phone number on facesheet? Yes   Assessment information obtained from? Patient   Expected Length of Stay (days) 3   Communicated expected length of stay with patient/caregiver yes   Prior to hospitilization cognitive status: Alert/Oriented   Prior to hospitalization functional status: Independent   Current cognitive status: Alert/Oriented   Current Functional Status: Independent   Lives With spouse   Able to Return to Prior Arrangements yes   Is patient able to care for self after discharge? Yes   Patient's perception of discharge disposition home or selfcare   Readmission Within the Last 30 Days no previous admission in last 30 days   Patient currently being followed by outpatient case management? No   Patient currently receives any other outside agency services? No   Equipment Currently Used at Home none   Do you have any problems affording any of your prescribed medications? No   Is the patient taking medications as prescribed? yes   Does the patient have transportation home? Yes   Transportation Anticipated family or friend will provide;car, drives self   Does the patient receive services at the [facility]? No   Discharge Plan A Home with family   Discharge Plan B Home with family;Home Health   DME Needed Upon Discharge  none   Patient/Family in Agreement with Plan yes

## 2019-09-27 NOTE — HOSPITAL COURSE
Pt underwent gastric sleeve on 9/26/2019. See operative note for procedure details. Tolerated procedure well. Post-operative course was uncomplicated. All post-operative milestones were met without delay. He was tolerating diet, ambulating, voiding spontaneously, and his pain was well controlled. Patient was instructed and educated on discharge instructions

## 2019-09-28 NOTE — NURSING
Pt discharged to home via wheelchair. Pt given prescriptions from outpt pharmacy delivery to room  and copy of discharged papers instructions. Pt denies pain at this time. Pt educated on signs and symptoms of when to call the doctor. All belongings with the patient . Pt verbalized understanding.

## 2019-10-02 ENCOUNTER — PATIENT MESSAGE (OUTPATIENT)
Dept: BARIATRICS | Facility: CLINIC | Age: 39
End: 2019-10-02

## 2019-10-03 ENCOUNTER — TELEPHONE (OUTPATIENT)
Dept: BARIATRICS | Facility: CLINIC | Age: 39
End: 2019-10-03

## 2019-10-03 NOTE — TELEPHONE ENCOUNTER
Called to check in 1 week post op from bariatric surgery - sleeve.    Dehydration assessment:  Urine output/color: y  Chest pain: n  Persistent increased heart rate: n  Fatigue:n  N/V: n  Dizzy/weak: y - after walking outside in the heat  BM: y  Protein and fluid intake assessment: (food diary)  Fluid intake: 48+  Protein supplements: 5-7 prot drinks (various prot drinks, all low in sugar)  Protein intake yesterday: 120g  Vitamins  -What vitamins are you taking? y  -Are you tolerating well? y  Medications  Omeprazole: y  Hycet: n  How are you tolerating pain at this time? 1 (rate on a scale from 1 to 10; >7 notify PA/MD)  Are you taking home/other medications or antibiotics as prescribed (if pt not taking antibiotics, per nursing/medical team - please instruct patient to call the clinic prior to taking any antibiotics within 30 days of surgery)? Are you having any problems? (f/u with PCP, cardiologist, endocrinologist)  How is your support system at home? Strict - good  Exercise reminder (light exercise at this time, no lifting above 10 lbs)     Questions for nurse/MA/PA: n     Assessment:  Doing well.     Discussion:  May advance to pureed diet as tolerated.  Continue to work on fluid and protein intake.     Confirmed date and time for 2 week po labs and clinic visit

## 2019-10-08 NOTE — PROGRESS NOTES
BARIATRIC FOLLOW UP:    Chief Complaint   Patient presents with    Follow-up     2 week post op sleeve 09/26/19 umair      HISTORY OF PRESENT ILLNESS: Trell Landaverde is a 38 y.o. male with a Body mass index is 49.32 kg/m². who presents for follow up s/p lap sleeve with Dr. Blas on 9/26/2019.  he is doing well and tolerating the diet without difficulty.  he is losing weight appropriately.  Feels hungry. Wants to advance to soft diet today. Doesn't eat eggs or fish. Has shellfish allergy. RD note for intake.  Dizzy with standing quickly, not always. On multiple meds for BP. Home BP 120s/80s. Today 90SBP. Saw Cards (Maurice) who discussed plan for weaning Lasix.     Review of Systems   Constitutional: Negative for chills, fever and malaise/fatigue.   Eyes: Negative for blurred vision and double vision.   Respiratory: Negative for cough, shortness of breath and wheezing.    Cardiovascular: Negative for chest pain, palpitations and leg swelling.   Gastrointestinal: Negative for abdominal pain, blood in stool, constipation, diarrhea, heartburn, melena, nausea and vomiting.        Denies dysphagia or globus sensation   Genitourinary: Negative for dysuria, frequency and urgency.   Musculoskeletal: Negative for back pain, joint pain, myalgias and neck pain.   Neurological: Positive for dizziness (if he stands too fast). Negative for tingling and headaches.   Psychiatric/Behavioral: Negative for depression and suicidal ideas. The patient is not nervous/anxious.        EXERCISE & VITAMINS:  Eliel citrate 500mg daily. Otherwise adherent with bariatric vitamin regimen  Exercise- walking    DIET:  Saw RD. See dietitian's note from today for further details.     Vitals:    10/09/19 0843   BP: 90/60   Pulse: 82       Physical Exam   Constitutional: He is oriented to person, place, and time. He appears well-developed and well-nourished. No distress.   HENT:   Head: Normocephalic and atraumatic.   Eyes: Right eye  exhibits no discharge. Left eye exhibits no discharge. No scleral icterus.   Cardiovascular: Normal rate, regular rhythm and intact distal pulses.   No murmur heard.  Pulmonary/Chest: Effort normal and breath sounds normal. No respiratory distress. He has no wheezes.   Abdominal: Soft. Bowel sounds are normal. He exhibits no distension and no mass. There is no tenderness. There is no rebound and no guarding.   WHSS   Musculoskeletal: He exhibits no edema.   Neurological: He is alert and oriented to person, place, and time.   Skin: Skin is warm and dry. He is not diaphoretic.   Psychiatric: He has a normal mood and affect. His behavior is normal. Judgment and thought content normal.   Nursing note and vitals reviewed.      ASSESSMENT:  - Orthostatic dizziness  - Morbid obesity, Body mass index is 49.32 kg/m².,  s/p sleeve gastrectomy .  - Estimated goal weight is 50% EWL  - Co-morbidities: HTN,. CHF, ICD, ORIN  - Good Weight loss, 36 lbs, 17% EWL  - Good Exercise regimen  - Ok Vitamin Regimen  - Fair Diet    PLAN:  - Slow positional changes. Continue lasix wean per cards. Agrees to call cards for BP parameters, regarding BP today, and also may also need faster wean based on BP today. Monitor BP daily and report to cards.  - Regular exercise and adherence to bariatric diet to achieve maximum weight loss.  - Wants soft diet now. Will d/w RD and JBW. Of note, he does not eat fish or eggs. Notes he tolerates puree chicken and beef without issue.  - Full bariatric vitamin regimen  - Ursodiol 500 mg daily for 6 months for the gallbladder.  - Anti-Acid medication, Omeprazole daily for 3 months.  - Lifting restriction, no lifting more than 10 lbs for 6 weeks total.  - Miralax daily for constipation, no fiber.  - No NSAIDs, Tylenol for pain.  - Can swallow whole pills on trial.  - RTC per post op schedule.  - Call the office for any issues.  - Check labs as scheduled.    20 minute visit, over 50% of time spent counseling  patient face to face on diet, exercise, and weight loss.

## 2019-10-09 ENCOUNTER — OFFICE VISIT (OUTPATIENT)
Dept: BARIATRICS | Facility: CLINIC | Age: 39
End: 2019-10-09
Payer: MEDICARE

## 2019-10-09 ENCOUNTER — LAB VISIT (OUTPATIENT)
Dept: LAB | Facility: HOSPITAL | Age: 39
End: 2019-10-09
Payer: MEDICARE

## 2019-10-09 ENCOUNTER — CLINICAL SUPPORT (OUTPATIENT)
Dept: BARIATRICS | Facility: CLINIC | Age: 39
End: 2019-10-09
Payer: MEDICARE

## 2019-10-09 VITALS
SYSTOLIC BLOOD PRESSURE: 90 MMHG | BODY MASS INDEX: 46.65 KG/M2 | HEART RATE: 82 BPM | DIASTOLIC BLOOD PRESSURE: 60 MMHG | HEIGHT: 69 IN | WEIGHT: 315 LBS

## 2019-10-09 DIAGNOSIS — R42 ORTHOSTATIC DIZZINESS: ICD-10-CM

## 2019-10-09 DIAGNOSIS — Z79.899 LONG TERM USE OF DRUG: ICD-10-CM

## 2019-10-09 DIAGNOSIS — R63.4 WEIGHT LOSS: ICD-10-CM

## 2019-10-09 DIAGNOSIS — Z71.3 NUTRITIONAL COUNSELING: ICD-10-CM

## 2019-10-09 DIAGNOSIS — R73.03 PRE-DIABETES: ICD-10-CM

## 2019-10-09 DIAGNOSIS — I50.40 COMBINED SYSTOLIC AND DIASTOLIC CONGESTIVE HEART FAILURE, NYHA CLASS 3, UNSPECIFIED CONGESTIVE HEART FAILURE CHRONICITY: ICD-10-CM

## 2019-10-09 DIAGNOSIS — A04.8 POSITIVE H. PYLORI TEST: ICD-10-CM

## 2019-10-09 DIAGNOSIS — G47.33 OSA (OBSTRUCTIVE SLEEP APNEA): ICD-10-CM

## 2019-10-09 DIAGNOSIS — Z98.890 POSTOPERATIVE STATE: Primary | ICD-10-CM

## 2019-10-09 DIAGNOSIS — E66.01 MORBID OBESITY: ICD-10-CM

## 2019-10-09 DIAGNOSIS — Z98.84 S/P LAPAROSCOPIC SLEEVE GASTRECTOMY: ICD-10-CM

## 2019-10-09 DIAGNOSIS — E55.9 VITAMIN D DEFICIENCY: ICD-10-CM

## 2019-10-09 DIAGNOSIS — Z95.810 IMPLANTABLE CARDIOVERTER-DEFIBRILLATOR (ICD) IN SITU: ICD-10-CM

## 2019-10-09 DIAGNOSIS — I10 BENIGN ESSENTIAL HYPERTENSION: ICD-10-CM

## 2019-10-09 DIAGNOSIS — I50.22 CHRONIC SYSTOLIC CONGESTIVE HEART FAILURE: ICD-10-CM

## 2019-10-09 LAB
ALBUMIN SERPL BCP-MCNC: 3.4 G/DL (ref 3.5–5.2)
ALP SERPL-CCNC: 87 U/L (ref 55–135)
ALT SERPL W/O P-5'-P-CCNC: 17 U/L (ref 10–44)
ANION GAP SERPL CALC-SCNC: 10 MMOL/L (ref 8–16)
AST SERPL-CCNC: 17 U/L (ref 10–40)
BILIRUB SERPL-MCNC: 0.2 MG/DL (ref 0.1–1)
BUN SERPL-MCNC: 11 MG/DL (ref 6–20)
CALCIUM SERPL-MCNC: 9.5 MG/DL (ref 8.7–10.5)
CHLORIDE SERPL-SCNC: 99 MMOL/L (ref 95–110)
CO2 SERPL-SCNC: 28 MMOL/L (ref 23–29)
CREAT SERPL-MCNC: 0.8 MG/DL (ref 0.5–1.4)
EST. GFR  (AFRICAN AMERICAN): >60 ML/MIN/1.73 M^2
EST. GFR  (NON AFRICAN AMERICAN): >60 ML/MIN/1.73 M^2
GLUCOSE SERPL-MCNC: 123 MG/DL (ref 70–110)
POTASSIUM SERPL-SCNC: 3.5 MMOL/L (ref 3.5–5.1)
PROT SERPL-MCNC: 7.3 G/DL (ref 6–8.4)
SODIUM SERPL-SCNC: 137 MMOL/L (ref 136–145)
VIT B12 SERPL-MCNC: 807 PG/ML (ref 210–950)

## 2019-10-09 PROCEDURE — 99024 PR POST-OP FOLLOW-UP VISIT: ICD-10-PCS | Mod: S$GLB,,, | Performed by: NURSE PRACTITIONER

## 2019-10-09 PROCEDURE — 99499 NO LOS: ICD-10-PCS | Mod: S$GLB,,, | Performed by: DIETITIAN, REGISTERED

## 2019-10-09 PROCEDURE — 99999 PR PBB SHADOW E&M-EST. PATIENT-LVL IV: ICD-10-PCS | Mod: PBBFAC,,, | Performed by: NURSE PRACTITIONER

## 2019-10-09 PROCEDURE — 99999 PR PBB SHADOW E&M-EST. PATIENT-LVL IV: CPT | Mod: PBBFAC,,, | Performed by: NURSE PRACTITIONER

## 2019-10-09 PROCEDURE — 80053 COMPREHEN METABOLIC PANEL: CPT

## 2019-10-09 PROCEDURE — 99499 UNLISTED E&M SERVICE: CPT | Mod: S$GLB,,, | Performed by: DIETITIAN, REGISTERED

## 2019-10-09 PROCEDURE — 99999 PR PBB SHADOW E&M-EST. PATIENT-LVL I: CPT | Mod: PBBFAC,,, | Performed by: DIETITIAN, REGISTERED

## 2019-10-09 PROCEDURE — 84425 ASSAY OF VITAMIN B-1: CPT

## 2019-10-09 PROCEDURE — 99024 POSTOP FOLLOW-UP VISIT: CPT | Mod: S$GLB,,, | Performed by: NURSE PRACTITIONER

## 2019-10-09 PROCEDURE — 82607 VITAMIN B-12: CPT

## 2019-10-09 PROCEDURE — 36415 COLL VENOUS BLD VENIPUNCTURE: CPT

## 2019-10-09 PROCEDURE — 99999 PR PBB SHADOW E&M-EST. PATIENT-LVL I: ICD-10-PCS | Mod: PBBFAC,,, | Performed by: DIETITIAN, REGISTERED

## 2019-10-09 RX ORDER — FERROUS SULFATE 220 (44)/5
ELIXIR ORAL
COMMUNITY
Start: 2019-09-19

## 2019-10-09 RX ORDER — UBIDECARENONE 75 MG
500 CAPSULE ORAL DAILY
COMMUNITY
End: 2019-12-12

## 2019-10-09 RX ORDER — ATORVASTATIN CALCIUM 10 MG/1
10 TABLET, FILM COATED ORAL DAILY
Refills: 0 | COMMUNITY
Start: 2019-08-27

## 2019-10-09 RX ORDER — RIBOFLAVIN (VITAMIN B2) 100 MG
TABLET ORAL
COMMUNITY
Start: 2019-09-17

## 2019-10-09 RX ORDER — TRAMADOL HYDROCHLORIDE AND ACETAMINOPHEN 37.5; 325 MG/1; MG/1
1 TABLET, FILM COATED ORAL EVERY 6 HOURS PRN
Refills: 0 | COMMUNITY
Start: 2019-08-15 | End: 2019-10-09

## 2019-10-09 RX ORDER — METFORMIN HYDROCHLORIDE 500 MG/1
500 TABLET ORAL DAILY
Refills: 2 | COMMUNITY
Start: 2019-08-27 | End: 2021-02-08

## 2019-10-09 NOTE — Clinical Note
"Hi! Any concerns with him advancing to soft diet at 2 weeks? Has been on puree x1 week and tolerating. Of note, does not eat "softer" soft foods like fish/shellfish, eggs. Does well with puree chicken, beef. I am messaging RD to please finish note.I offered 1 more week of puree, he wants soft now.-NF"

## 2019-10-09 NOTE — PROGRESS NOTES
NUTRITION NOTE    Referring Physician: Erik Blas M.D.  Reason for MNT Referral: Follow-up 2 Weeks s/p Gastric Sleeve    PAST MEDICAL HISTORY:  Denies nausea, vomiting and diarrhea and constipation   Reports problems, including excessive hunger.    Past Medical History:   Diagnosis Date    Allergy     Cardiomyopathy     CHF (congestive heart failure)     Depression     Hypertension     Obesity        CLINICAL DATA:  38 y.o. male.    There were no vitals filed for this visit.    Current Weight: 333 lbs  BMI: 49.32  Total Weight Loss:36  lbs  Excess Weight Loss: 17%    LABS:  Reviewed. - pending vitamin labs     CURRENT DIET:  Bariatric Liquid Diet with puree meats     Diet Recall: 150-210 grams of protein/day - patient is so hungry he is drinking 5 to 7 shakes/ day ; ~ 64 oz of fluids/day ( besides protein shakes)    Diet Includes:  5- 7 protein shakes/ day, tolerated puree chicken and beef. Does not eat fish, eggs or fish.  Diet advanced early by RD related to severe hunger pains.   Protein Supplements: premier, muscle milk    EXERCISE:  Adequate light exercise.    Restrictions to Exercise: None.    VITAMINS/MINERALS:  Multivitamins: 2 daily  - chewable   B-Complex: super b complex  Calcium Citrate + Vitamin D: 3 times/ day ( 1500 mg)  Vitamin B12: Sublingual.    ASSESSMENT:  Doing fairly well overall.  Excess protein intake.  Excess fluid intake   on shakes     BARIATRIC DIET DISCUSSION:  Instructed and provided written materials on bariatric pureed and soft diet plan. Provided soft diet meal plan to start trying soft well cooked vegetables  Reinforced post-op nutrition guidelines. Patient told to limit shakes to  4 / day.    PLAN/RECOMMONDATIONS:  Advance to bariatric soft diet as tolerated - No oatmeal   or grits - call RD if any issues with tolerance. Pt reports understanding.   Decrease protein intake.  Maintain fluid intake.  Continue light exercise.  Continue appropriate vitamins &  minerals.      Return to clinic in   2 weeks.     SESSION TIME: 15 minutes

## 2019-10-10 ENCOUNTER — TELEPHONE (OUTPATIENT)
Dept: BARIATRICS | Facility: CLINIC | Age: 39
End: 2019-10-10

## 2019-10-10 NOTE — TELEPHONE ENCOUNTER
" Called patient  To advance diet to soft foods.  Informed patient to  Take pea sized bites, 20 - 30 chews and  Stop eating when 80 percent full.  Patient reports understanding.  Advised patient to contact RD and doctor is any issues with soft foods.     Sandra Newton RD LDN     ----- Message from JUVENAL Marcus sent at 10/9/2019  5:30 PM CDT -----  Hi Denise, please see Dr. Blas's note below. Can you call patient to advance, and please advise him to call if he has any problems and he will have to revert to puree again. Slow intake (pea size bite, 20-30 chews, a  bite a minute) is a very important point to emphasize in his case. Stop when he feels 80% full.  - Paula  ----- Message -----  From: Erik Blas Jr., MD  Sent: 10/9/2019   1:44 PM CDT  To: JUVENAL Marcus    I think that should be fine.    ----- Message -----  From: JUVENAL Marcus  Sent: 10/9/2019  11:17 AM CDT  To: Erik Blas Jr., MD    Hi! Any concerns with him advancing to soft diet at 2 weeks? Has been on puree x1 week and tolerating. Of note, does not eat "softer" soft foods like fish/shellfish, eggs. Does well with puree chicken, beef. I am messaging RD to please finish note.  I offered 1 more week of puree, he wants soft now.  -NF        "

## 2019-10-11 LAB — VIT B1 BLD-MCNC: 65 UG/L (ref 38–122)

## 2019-10-28 ENCOUNTER — OFFICE VISIT (OUTPATIENT)
Dept: BARIATRICS | Facility: CLINIC | Age: 39
End: 2019-10-28
Payer: MEDICARE

## 2019-10-28 ENCOUNTER — CLINICAL SUPPORT (OUTPATIENT)
Dept: BARIATRICS | Facility: CLINIC | Age: 39
End: 2019-10-28
Payer: MEDICARE

## 2019-10-28 VITALS
WEIGHT: 315 LBS | HEART RATE: 93 BPM | BODY MASS INDEX: 46.65 KG/M2 | DIASTOLIC BLOOD PRESSURE: 92 MMHG | SYSTOLIC BLOOD PRESSURE: 122 MMHG | HEIGHT: 69 IN

## 2019-10-28 DIAGNOSIS — Z98.84 S/P LAPAROSCOPIC SLEEVE GASTRECTOMY: ICD-10-CM

## 2019-10-28 DIAGNOSIS — R63.4 WEIGHT LOSS: ICD-10-CM

## 2019-10-28 DIAGNOSIS — Z98.890 POSTOPERATIVE STATE: Primary | ICD-10-CM

## 2019-10-28 PROCEDURE — 99999 PR PBB SHADOW E&M-EST. PATIENT-LVL V: ICD-10-PCS | Mod: PBBFAC,,, | Performed by: NURSE PRACTITIONER

## 2019-10-28 PROCEDURE — 99499 NO LOS: ICD-10-PCS | Mod: S$GLB,,, | Performed by: DIETITIAN, REGISTERED

## 2019-10-28 PROCEDURE — 99999 PR PBB SHADOW E&M-EST. PATIENT-LVL V: CPT | Mod: PBBFAC,,, | Performed by: NURSE PRACTITIONER

## 2019-10-28 PROCEDURE — 99024 PR POST-OP FOLLOW-UP VISIT: ICD-10-PCS | Mod: S$GLB,,, | Performed by: NURSE PRACTITIONER

## 2019-10-28 PROCEDURE — 99499 UNLISTED E&M SERVICE: CPT | Mod: S$GLB,,, | Performed by: DIETITIAN, REGISTERED

## 2019-10-28 PROCEDURE — 99024 POSTOP FOLLOW-UP VISIT: CPT | Mod: S$GLB,,, | Performed by: NURSE PRACTITIONER

## 2019-10-28 PROCEDURE — 99999 PR PBB SHADOW E&M-EST. PATIENT-LVL I: CPT | Mod: PBBFAC,,, | Performed by: DIETITIAN, REGISTERED

## 2019-10-28 PROCEDURE — 99999 PR PBB SHADOW E&M-EST. PATIENT-LVL I: ICD-10-PCS | Mod: PBBFAC,,, | Performed by: DIETITIAN, REGISTERED

## 2019-10-28 RX ORDER — CARVEDILOL 3.12 MG/1
3.12 TABLET ORAL DAILY
COMMUNITY
Start: 2019-10-17 | End: 2022-06-24 | Stop reason: SDUPTHER

## 2019-10-28 RX ORDER — FUROSEMIDE 40 MG/1
20 TABLET ORAL
COMMUNITY
Start: 2019-10-16 | End: 2022-06-24 | Stop reason: SDUPTHER

## 2019-10-28 RX ORDER — DULOXETIN HYDROCHLORIDE 30 MG/1
30 CAPSULE, DELAYED RELEASE ORAL DAILY
Refills: 0 | COMMUNITY
Start: 2019-10-16 | End: 2021-02-08 | Stop reason: SDUPTHER

## 2019-10-28 RX ORDER — ZOLPIDEM TARTRATE 10 MG/1
10 TABLET ORAL
COMMUNITY
Start: 2019-10-16 | End: 2019-10-28 | Stop reason: SDUPTHER

## 2019-10-28 NOTE — PROGRESS NOTES
NUTRITION NOTE    Referring Physician: Erik Blas M.D.  Reason for MNT Referral: Follow-up 4 Weeks s/p Gastric Sleeve    PAST MEDICAL HISTORY:  Denies nausea, vomiting, constipation and diarrhea.  Reports problems, including hunger.    Past Medical History:   Diagnosis Date    Allergy     Cardiomyopathy     CHF (congestive heart failure)     Depression     Hypertension     Obesity        CLINICAL DATA:  38 y.o. male.    There were no vitals filed for this visit.    Current Weight: 331 lbs  BMI: 49  Total Weight Loss: 38 lbs  Excess Weight Loss: 18 %    LABS:  Reviewed.    CURRENT DIET:  Bariatric soft Diet    Diet Recall:  > 80 grams of protein/day; > 64 oz of fluids/day    Diet Includes: subway sandwich ( took 3 days to eat), steak, chili, pot roast, cauliflower, carrots, peach, banana    Meal Pattern: 2 meal(s) + 2 snack(s) +   2 - 3 protein supplement(s)   Adequate protein supplement intake.  Adequate dairy intake.  Adequate vegetable intake.  Adequate fruit intake.  Starchy CHO: none      EXERCISE:  None.    Restrictions to Exercise: None.    VITAMINS/MINERALS:  Multivitamins: 2 daily with iron   B-Complex: thiamin 50 mg   Calcium Citrate + Vitamin D: 500 mg twice/ day  Vitamin B12: Sublingual.    ASSESSMENT:  Doing fairly well overall.  Adequate protein intake.  Adequate fluid intake.  Advancing diet inappropriately.  Not exercising.  Inadequate vitamins & minerals.    BARIATRIC DIET DISCUSSION:  Instructed and provided written materials on bariatric soft diet plan.  Reinforced post-op nutrition guidelines.    PLAN / RECOMMENDATIONS:  Advance to bariatric regular diet - already eating raw fruit and vegetables and tolerating    Maintain protein intake.  Maintain fluid intake.  Begin light exercise.  Increase appropriate vitamins & minerals.  Adjust vitamins & minerals by  1 more dose of calcium citrate/ day     No starchy carbs .    Return to clinic in 2 months.    SESSION TIME: 15 minutes

## 2019-10-28 NOTE — PROGRESS NOTES
BARIATRIC FOLLOW UP:    Chief Complaint   Patient presents with    Follow-up     HISTORY OF PRESENT ILLNESS: Trell Landaverde is a 38 y.o. male with a Body mass index is 49 kg/m². who presents for follow up s/p lap sleeve with Dr. Blas on 9/26/2019.  he is doing well and tolerating the diet without difficulty.  he is losing weight appropriately.  Decrease in BP meds, orthostatic dizziness resolved.    Constipation. LBM this AM, large volume. Medium hard. 1 Cap Miralax daily.      Review of Systems   Constitutional: Negative for chills, fever and malaise/fatigue.   Eyes: Negative for blurred vision and double vision.   Respiratory: Negative for cough, shortness of breath and wheezing.    Cardiovascular: Negative for chest pain, palpitations and leg swelling.   Gastrointestinal: Positive for constipation. Negative for abdominal pain, blood in stool, diarrhea, heartburn, melena, nausea and vomiting.        Denies dysphagia or globus sensation   Genitourinary: Negative for dysuria, frequency and urgency.   Musculoskeletal: Negative for back pain, joint pain, myalgias and neck pain.   Neurological: Negative for dizziness, tingling and headaches.   Psychiatric/Behavioral: Negative for depression, hallucinations and suicidal ideas. The patient is not nervous/anxious.      EXERCISE & VITAMINS:  Eliel citrate 500mg twice daily. Otherwise adherent with bariatric vitamin regimen.  Fe and Ca.  Exercise- walking, < 1 mile     DIET:  2 protein shakes daily, AMP Wheybolic, 25g protein each, 1 meal daily eg roast with carrots and gravy. > 64 oz SFliquids bottle daily. Reports 3 days to eat 1 6 inch [employer] sandwich.  See dietitian's note from today for further details.     Vitals:    10/28/19 1115   BP: (!) 122/92   Pulse: 93     Physical Exam   Constitutional: He is oriented to person, place, and time. He appears well-developed and well-nourished. No distress.   HENT:   Head: Normocephalic and atraumatic.   Eyes:  Right eye exhibits no discharge. Left eye exhibits no discharge. No scleral icterus.   Cardiovascular: Normal rate, regular rhythm, normal heart sounds and intact distal pulses.   No murmur heard.  Pulmonary/Chest: Effort normal and breath sounds normal. No respiratory distress. He has no wheezes.   Abdominal: Soft. Bowel sounds are normal. He exhibits no distension and no mass. There is no tenderness. There is no rebound and no guarding. No hernia.   WHSS   Musculoskeletal: He exhibits no edema.   Neurological: He is alert and oriented to person, place, and time.   Skin: Skin is warm and dry. He is not diaphoretic.   Psychiatric: He has a normal mood and affect. His behavior is normal. Judgment and thought content normal.   Nursing note and vitals reviewed.    ASSESSMENT:  - Constipaton  - Morbid obesity, Body mass index is 49 kg/m².,  s/p sleeve gastrectomy .  - Estimated goal weight is 50% EWL  - Co-morbidities: HTN,. CHF, ICD, ORIN  - Good Weight loss, 38 lbs, 18% EWL  - Good Exercise regimen  - Ok Vitamin Regimen  - Fair Diet    PLAN:  - May increase Miralax to 1-2 caps daily, continuing adequate hydration. May trial OTC Colace as needed.  - Regular exercise and adherence to bariatric diet to achieve maximum weight loss.  - RD for diet advance. No raw veggies at this time.  - Full bariatric vitamin regimen  - Ursodiol 500 mg daily for 6 months for the gallbladder.  - Anti-Acid medication, Omeprazole daily for 3 months.  - Lifting restriction, no lifting more than 10 lbs for 6 weeks total.  - Miralax daily for constipation, no fiber.  - No NSAIDs, Tylenol for pain.  - Can swallow whole pills  - RTC per post op schedule.  - Call the office for any issues.  - Check labs as scheduled.    30 minute visit, over 50% of time spent counseling patient face to face on diet, exercise, and weight loss.

## 2019-11-20 RX ORDER — OMEPRAZOLE 40 MG/1
40 CAPSULE, DELAYED RELEASE ORAL EVERY MORNING
Qty: 30 CAPSULE | Refills: 2 | Status: SHIPPED | OUTPATIENT
Start: 2019-11-20

## 2019-11-20 RX ORDER — URSODIOL 500 MG/1
TABLET, FILM COATED ORAL
Qty: 90 TABLET | Refills: 0 | Status: SHIPPED | OUTPATIENT
Start: 2019-11-20 | End: 2020-03-02

## 2019-12-04 RX ORDER — URSODIOL 500 MG/1
TABLET, FILM COATED ORAL
Qty: 90 TABLET | Refills: 0 | OUTPATIENT
Start: 2019-12-04

## 2019-12-04 NOTE — TELEPHONE ENCOUNTER
Refill too soon.   Was he able to obtain refill written 2 weeks ago?  Only needs for 6 months total.

## 2019-12-12 ENCOUNTER — OFFICE VISIT (OUTPATIENT)
Dept: TRANSPLANT | Facility: CLINIC | Age: 39
End: 2019-12-12
Payer: MEDICARE

## 2019-12-12 ENCOUNTER — OFFICE VISIT (OUTPATIENT)
Dept: BARIATRICS | Facility: CLINIC | Age: 39
End: 2019-12-12
Payer: MEDICARE

## 2019-12-12 ENCOUNTER — CLINICAL SUPPORT (OUTPATIENT)
Dept: BARIATRICS | Facility: CLINIC | Age: 39
End: 2019-12-12
Payer: MEDICARE

## 2019-12-12 ENCOUNTER — LAB VISIT (OUTPATIENT)
Dept: LAB | Facility: HOSPITAL | Age: 39
End: 2019-12-12
Payer: MEDICARE

## 2019-12-12 VITALS
HEART RATE: 60 BPM | DIASTOLIC BLOOD PRESSURE: 64 MMHG | HEIGHT: 69 IN | WEIGHT: 313 LBS | SYSTOLIC BLOOD PRESSURE: 115 MMHG | BODY MASS INDEX: 46.36 KG/M2

## 2019-12-12 VITALS
BODY MASS INDEX: 46.65 KG/M2 | HEART RATE: 83 BPM | SYSTOLIC BLOOD PRESSURE: 116 MMHG | DIASTOLIC BLOOD PRESSURE: 84 MMHG | HEIGHT: 69 IN | WEIGHT: 315 LBS

## 2019-12-12 DIAGNOSIS — Z98.890 POSTOPERATIVE STATE: Primary | ICD-10-CM

## 2019-12-12 DIAGNOSIS — I50.22 CHRONIC SYSTOLIC CONGESTIVE HEART FAILURE: ICD-10-CM

## 2019-12-12 DIAGNOSIS — Z95.810 IMPLANTABLE CARDIOVERTER-DEFIBRILLATOR (ICD) IN SITU: ICD-10-CM

## 2019-12-12 DIAGNOSIS — Z95.810 S/P ICD (INTERNAL CARDIAC DEFIBRILLATOR) PROCEDURE: Primary | ICD-10-CM

## 2019-12-12 DIAGNOSIS — I50.40 COMBINED SYSTOLIC AND DIASTOLIC CONGESTIVE HEART FAILURE, NYHA CLASS 3, UNSPECIFIED CONGESTIVE HEART FAILURE CHRONICITY: ICD-10-CM

## 2019-12-12 DIAGNOSIS — I10 BENIGN ESSENTIAL HYPERTENSION: ICD-10-CM

## 2019-12-12 DIAGNOSIS — R63.4 WEIGHT LOSS: ICD-10-CM

## 2019-12-12 DIAGNOSIS — Z98.84 S/P LAPAROSCOPIC SLEEVE GASTRECTOMY: ICD-10-CM

## 2019-12-12 DIAGNOSIS — E66.01 MORBID OBESITY DUE TO EXCESS CALORIES: ICD-10-CM

## 2019-12-12 DIAGNOSIS — G47.33 OSA (OBSTRUCTIVE SLEEP APNEA): ICD-10-CM

## 2019-12-12 DIAGNOSIS — Z71.3 NUTRITIONAL COUNSELING: ICD-10-CM

## 2019-12-12 DIAGNOSIS — A04.8 POSITIVE H. PYLORI TEST: ICD-10-CM

## 2019-12-12 DIAGNOSIS — E55.9 VITAMIN D DEFICIENCY: ICD-10-CM

## 2019-12-12 DIAGNOSIS — I42.0 NONISCHEMIC DILATED CARDIOMYOPATHY: ICD-10-CM

## 2019-12-12 DIAGNOSIS — E66.01 MORBID OBESITY: ICD-10-CM

## 2019-12-12 DIAGNOSIS — Z79.899 LONG TERM USE OF DRUG: ICD-10-CM

## 2019-12-12 DIAGNOSIS — R73.03 PRE-DIABETES: ICD-10-CM

## 2019-12-12 LAB
25(OH)D3+25(OH)D2 SERPL-MCNC: 32 NG/ML (ref 30–96)
ALBUMIN SERPL BCP-MCNC: 3.5 G/DL (ref 3.5–5.2)
ALP SERPL-CCNC: 97 U/L (ref 55–135)
ALT SERPL W/O P-5'-P-CCNC: 19 U/L (ref 10–44)
ANION GAP SERPL CALC-SCNC: 8 MMOL/L (ref 8–16)
AST SERPL-CCNC: 16 U/L (ref 10–40)
BASOPHILS # BLD AUTO: 0.02 K/UL (ref 0–0.2)
BASOPHILS NFR BLD: 0.2 % (ref 0–1.9)
BILIRUB SERPL-MCNC: 0.4 MG/DL (ref 0.1–1)
BUN SERPL-MCNC: 6 MG/DL (ref 6–20)
CALCIUM SERPL-MCNC: 9.2 MG/DL (ref 8.7–10.5)
CHLORIDE SERPL-SCNC: 104 MMOL/L (ref 95–110)
CHOLEST SERPL-MCNC: 164 MG/DL (ref 120–199)
CHOLEST/HDLC SERPL: 3.7 {RATIO} (ref 2–5)
CO2 SERPL-SCNC: 29 MMOL/L (ref 23–29)
CREAT SERPL-MCNC: 0.7 MG/DL (ref 0.5–1.4)
DIFFERENTIAL METHOD: ABNORMAL
EOSINOPHIL # BLD AUTO: 0 K/UL (ref 0–0.5)
EOSINOPHIL NFR BLD: 0.2 % (ref 0–8)
ERYTHROCYTE [DISTWIDTH] IN BLOOD BY AUTOMATED COUNT: 14.5 % (ref 11.5–14.5)
EST. GFR  (AFRICAN AMERICAN): >60 ML/MIN/1.73 M^2
EST. GFR  (NON AFRICAN AMERICAN): >60 ML/MIN/1.73 M^2
GLUCOSE SERPL-MCNC: 99 MG/DL (ref 70–110)
HCT VFR BLD AUTO: 44.5 % (ref 40–54)
HDLC SERPL-MCNC: 44 MG/DL (ref 40–75)
HDLC SERPL: 26.8 % (ref 20–50)
HGB BLD-MCNC: 14 G/DL (ref 14–18)
IMM GRANULOCYTES # BLD AUTO: 0.04 K/UL (ref 0–0.04)
IMM GRANULOCYTES NFR BLD AUTO: 0.3 % (ref 0–0.5)
IRON SERPL-MCNC: 75 UG/DL (ref 45–160)
LDLC SERPL CALC-MCNC: 96.6 MG/DL (ref 63–159)
LYMPHOCYTES # BLD AUTO: 2.8 K/UL (ref 1–4.8)
LYMPHOCYTES NFR BLD: 23 % (ref 18–48)
MCH RBC QN AUTO: 28.3 PG (ref 27–31)
MCHC RBC AUTO-ENTMCNC: 31.5 G/DL (ref 32–36)
MCV RBC AUTO: 90 FL (ref 82–98)
MONOCYTES # BLD AUTO: 0.8 K/UL (ref 0.3–1)
MONOCYTES NFR BLD: 6.6 % (ref 4–15)
NEUTROPHILS # BLD AUTO: 8.6 K/UL (ref 1.8–7.7)
NEUTROPHILS NFR BLD: 69.7 % (ref 38–73)
NONHDLC SERPL-MCNC: 120 MG/DL
NRBC BLD-RTO: 0 /100 WBC
PLATELET # BLD AUTO: 249 K/UL (ref 150–350)
PMV BLD AUTO: 10.9 FL (ref 9.2–12.9)
POTASSIUM SERPL-SCNC: 3.9 MMOL/L (ref 3.5–5.1)
PROT SERPL-MCNC: 7.2 G/DL (ref 6–8.4)
RBC # BLD AUTO: 4.94 M/UL (ref 4.6–6.2)
SATURATED IRON: 22 % (ref 20–50)
SODIUM SERPL-SCNC: 141 MMOL/L (ref 136–145)
TOTAL IRON BINDING CAPACITY: 348 UG/DL (ref 250–450)
TRANSFERRIN SERPL-MCNC: 235 MG/DL (ref 200–375)
TRIGL SERPL-MCNC: 117 MG/DL (ref 30–150)
VIT B12 SERPL-MCNC: 925 PG/ML (ref 210–950)
WBC # BLD AUTO: 12.31 K/UL (ref 3.9–12.7)

## 2019-12-12 PROCEDURE — 3074F SYST BP LT 130 MM HG: CPT | Mod: CPTII,S$GLB,, | Performed by: INTERNAL MEDICINE

## 2019-12-12 PROCEDURE — 99999 PR PBB SHADOW E&M-EST. PATIENT-LVL III: CPT | Mod: PBBFAC,,, | Performed by: INTERNAL MEDICINE

## 2019-12-12 PROCEDURE — 80053 COMPREHEN METABOLIC PANEL: CPT

## 2019-12-12 PROCEDURE — 99999 PR PBB SHADOW E&M-EST. PATIENT-LVL I: ICD-10-PCS | Mod: PBBFAC,,, | Performed by: DIETITIAN, REGISTERED

## 2019-12-12 PROCEDURE — 84425 ASSAY OF VITAMIN B-1: CPT

## 2019-12-12 PROCEDURE — 99999 PR PBB SHADOW E&M-EST. PATIENT-LVL III: ICD-10-PCS | Mod: PBBFAC,,, | Performed by: INTERNAL MEDICINE

## 2019-12-12 PROCEDURE — 3008F PR BODY MASS INDEX (BMI) DOCUMENTED: ICD-10-PCS | Mod: CPTII,S$GLB,, | Performed by: INTERNAL MEDICINE

## 2019-12-12 PROCEDURE — 82306 VITAMIN D 25 HYDROXY: CPT

## 2019-12-12 PROCEDURE — 3008F BODY MASS INDEX DOCD: CPT | Mod: CPTII,S$GLB,, | Performed by: INTERNAL MEDICINE

## 2019-12-12 PROCEDURE — 99499 NO LOS: ICD-10-PCS | Mod: S$GLB,,, | Performed by: DIETITIAN, REGISTERED

## 2019-12-12 PROCEDURE — 82607 VITAMIN B-12: CPT

## 2019-12-12 PROCEDURE — 99999 PR PBB SHADOW E&M-EST. PATIENT-LVL V: CPT | Mod: PBBFAC,,, | Performed by: NURSE PRACTITIONER

## 2019-12-12 PROCEDURE — 99024 POSTOP FOLLOW-UP VISIT: CPT | Mod: S$GLB,,, | Performed by: NURSE PRACTITIONER

## 2019-12-12 PROCEDURE — 99215 PR OFFICE/OUTPT VISIT, EST, LEVL V, 40-54 MIN: ICD-10-PCS | Mod: S$GLB,,, | Performed by: INTERNAL MEDICINE

## 2019-12-12 PROCEDURE — 99999 PR PBB SHADOW E&M-EST. PATIENT-LVL I: CPT | Mod: PBBFAC,,, | Performed by: DIETITIAN, REGISTERED

## 2019-12-12 PROCEDURE — 99499 UNLISTED E&M SERVICE: CPT | Mod: S$GLB,,, | Performed by: DIETITIAN, REGISTERED

## 2019-12-12 PROCEDURE — 85025 COMPLETE CBC W/AUTO DIFF WBC: CPT

## 2019-12-12 PROCEDURE — 3079F DIAST BP 80-89 MM HG: CPT | Mod: CPTII,S$GLB,, | Performed by: INTERNAL MEDICINE

## 2019-12-12 PROCEDURE — 83540 ASSAY OF IRON: CPT

## 2019-12-12 PROCEDURE — 99215 OFFICE O/P EST HI 40 MIN: CPT | Mod: S$GLB,,, | Performed by: INTERNAL MEDICINE

## 2019-12-12 PROCEDURE — 80061 LIPID PANEL: CPT

## 2019-12-12 PROCEDURE — 99024 PR POST-OP FOLLOW-UP VISIT: ICD-10-PCS | Mod: S$GLB,,, | Performed by: NURSE PRACTITIONER

## 2019-12-12 PROCEDURE — 3079F PR MOST RECENT DIASTOLIC BLOOD PRESSURE 80-89 MM HG: ICD-10-PCS | Mod: CPTII,S$GLB,, | Performed by: INTERNAL MEDICINE

## 2019-12-12 PROCEDURE — 99999 PR PBB SHADOW E&M-EST. PATIENT-LVL V: ICD-10-PCS | Mod: PBBFAC,,, | Performed by: NURSE PRACTITIONER

## 2019-12-12 PROCEDURE — 3074F PR MOST RECENT SYSTOLIC BLOOD PRESSURE < 130 MM HG: ICD-10-PCS | Mod: CPTII,S$GLB,, | Performed by: INTERNAL MEDICINE

## 2019-12-12 PROCEDURE — 36415 COLL VENOUS BLD VENIPUNCTURE: CPT

## 2019-12-12 RX ORDER — AMIODARONE HYDROCHLORIDE 200 MG/1
TABLET ORAL
COMMUNITY
Start: 2019-12-02 | End: 2021-02-08 | Stop reason: ALTCHOICE

## 2019-12-12 NOTE — PROGRESS NOTES
NUTRITION NOTE    Referring Physician: Erik Blas M.D.  Reason for MNT Referral: Follow-up 10 weeks s/p Gastric Sleeve    PAST MEDICAL HISTORY:    Denies nausea, vomiting, constipation and diarrhea.  Reports problems, including excess gas, iggy for the first 30 min after he wakes up. Gas is not smelly and no stomach cramping. He did have diarrhea with one episode after eating ice cream that he knows he shouldn't have. He does not drink milk or eat much dairy products. He stopped using his whey powder + water for the past 3 days to see if that would help - it didn't. He does eat a variety of gas-producing foods: cabbage, broccoli, cauliflower, beans, onions, apples. He doesn't drink carbonated drinks or chew gum. He is allergic to fish/seafood/eggs. He doesn't eat chicken, doesn't like it since before surgery.    Past Medical History:   Diagnosis Date    Allergy     Cardiomyopathy     CHF (congestive heart failure)     Depression     Hypertension     Obesity        CLINICAL DATA:  39 y.o. male.    Excess Weight Loss: 27%    LABS:  Reviewed.    CURRENT DIET:  Bariatric Diet.  Diet Recall: 30 grams of protein/day; 85 oz of fluids/day    L: 1/2 cup meatloaf, cauli-mash  D: 1/2 cup meatloaf, cauli-mash    Arlen: water    Diet Includes:  Meal Pattern: 2 meal(s) + 0-2 protein supplement(s)  Inadequate protein supplement intake. Amp prot powder (25g prot, 1g sugar, not lactose free) + water  Avoids dairy intake.  Adequate vegetable intake. No raw vegetables and lettuce yet.  Adequate fruit intake.  Starchy CHO: avoids    EXERCISE:  Adequate light exercise.    Restrictions to Exercise: None.    VITAMINS / MINERALS:    ASSESSMENT:  Doing fairly well overall.  Weight loss.  Adequate calorie intake.  Inadequate protein intake.  Adequate fluid intake.  Following diet appropriately.  Not exercising.  Adequate vitamins & minerals.    BARIATRIC DIET DISCUSSION:  Instructed and provided written materials on bariatric diet  plan.  Reinforced post-op nutrition guidelines.    PLAN / RECOMMENDATIONS:  May begin to incorporate raw vegetables, lettuce, unsalted nuts, and protein bars as tolerated.  Adjust diet by limiting gas producing foods. Then experiment with which foods are tolerated well and not to try to add some back in. Provided list of foods to limit/avoid.  Resume protein supplements as [place]. May try lactose free prot powder if needed.  Maintain fluid intake.  Begin exercise.  Continue appropriate vitamins & minerals.    Return to clinic in 3 months.    SESSION TIME: 30 minutes

## 2019-12-12 NOTE — PROGRESS NOTES
BARIATRIC FOLLOW UP:    Chief Complaint   Patient presents with    Follow-up     4 week post op      HISTORY OF PRESENT ILLNESS: Trell Landaverde is a 39 y.o. male with a Body mass index is 46.22 kg/m². who presents for follow up s/p lap sleeve with Dr. Blas on 9/26/2019.  he is doing well and tolerating the diet without difficulty. Feels hungry.  he is losing weight appropriately. Sometimes has nausea at night after dinner. Denies abdominal pain, vomiting, dypsphagia, globus sensation.    DIET:  Meals- eg: thin steak ~1.5oz over cauliflower, ~1oz pork chop  2 meals and 2 protein shakes daily, AMP Wheybolic, 25g protein each, - sends him to the bathroom.   Also c/o frequent gas. Takes simethicone every day. Has tried 3 days of no lactose and still passing gas often. BMs are formed, medium hard, no foul odor, effective. Does not look at stool. Does not take Miralax.   Rare bread, tried potato once, cookies once, ice cream once which caused a lot of pain.   64+oz water daily  1/2 spoon sized bites.    EXERCISE & VITAMINS:  Eliel citrate 500mg twice daily. Otherwise adherent with bariatric vitamin regimen.  Fe and Ca.  Exercise- walking, 3000 steps daily, ~ 1 -1.7 miles  See dietitian's note from today for further details.    Some lingering discomfort to main incision, which continues to improve. No pain or swelling or bulging.     Review of Systems   Constitutional: Negative for chills, fever and malaise/fatigue.   Eyes: Negative for blurred vision and double vision.   Respiratory: Negative for shortness of breath and wheezing.    Cardiovascular: Negative for chest pain and leg swelling.   Gastrointestinal: Positive for constipation and nausea (ocassionally before bed). Negative for abdominal pain, blood in stool, diarrhea, heartburn, melena and vomiting.        Denies dysphagia or globus sensation   Musculoskeletal: Negative for back pain, joint pain, myalgias and neck pain.   Neurological: Negative for  dizziness, tingling and headaches.   Psychiatric/Behavioral: Negative for depression, hallucinations, memory loss and suicidal ideas. The patient is not nervous/anxious.        Vitals:    12/12/19 1020   BP: 115/64   Pulse: 60     Physical Exam   Constitutional: He is oriented to person, place, and time. He appears well-developed and well-nourished. No distress.   HENT:   Head: Normocephalic and atraumatic.   Eyes: Right eye exhibits no discharge. Left eye exhibits no discharge. No scleral icterus.   Cardiovascular: Normal rate and regular rhythm.   Pulmonary/Chest: Effort normal and breath sounds normal. No respiratory distress. He has no wheezes.   Abdominal: Soft. Bowel sounds are normal. He exhibits no distension and no mass. There is no tenderness. There is no rebound and no guarding. No hernia.   WHSS   Musculoskeletal: He exhibits no edema.   Neurological: He is alert and oriented to person, place, and time.   Skin: Skin is warm and dry. He is not diaphoretic.   Psychiatric: He has a normal mood and affect. His behavior is normal. Judgment and thought content normal.   Nursing note and vitals reviewed.    ASSESSMENT:  - Flatulence   - Constipaton  - Morbid obesity, Body mass index is 46.22 kg/m².,  s/p sleeve gastrectomy .  - Estimated goal weight is 50% EWL  - Co-morbidities: HTN,. CHF, ICD, ORIN  - Good Weight loss, 56 lbs, 27% EWL  - Good Exercise regimen  - Ok Vitamin Regimen  - Fair Diet    PLAN:  - Resume Miralax daily, add in OTC Colace.  - Alternate shakes: must be lactose free. RD to discuss additionally.  - Decrease bite sizes, re enforced slow intake  - Continue PPI  If the above is ineffective we can consider gallbladder.  - Regular exercise and adherence to bariatric diet to achieve maximum weight loss.  - RD for diet advance.   - Full bariatric vitamin regimen  - Ursodiol 500 mg daily for 6 months for the gallbladder.  - no more lifting restriction  - No NSAIDs, Tylenol for pain.  - RTC per post  op schedule.  - Call the office for any issues. Call if abd incision discomfort does not continue to resolve.  - Check labs as scheduled.    30 minute visit, over 50% of time spent counseling patient face to face on diet, exercise, and weight loss.

## 2019-12-12 NOTE — LETTER
December 12, 2019        Germán Richards  [address]  [place] [address]  Phone: [phone]  Fax: [phone]             [facility] [address] [place] [address]  Phone: [phone]   Patient: Trell Landaverde   MR Number: [id number]   YOB: 1980   Date of Visit: 12/12/2019       Dear Dr. Germán Richards    Thank you for referring Trell Landaverde to me for evaluation. Attached you will find relevant portions of my assessment and plan of care.    If you have questions, please do not hesitate to call me. I look forward to following Trell Landaverde along with you.    Sincerely,    Israel Mariee MD    Enclosure    If you would like to receive this communication electronically, please contact [email or web address] or [phone] to request Nuvola Link access.    Nuvola Link is a tool which provides read-only access to select patient information with whom you have a relationship. Its easy to use and provides real time access to review your patients record including encounter summaries, notes, results, and demographic information.    If you feel you have received this communication in error or would no longer like to receive these types of communications, please e-mail [email or web address]

## 2019-12-12 NOTE — PATIENT INSTRUCTIONS
Fruits and Vegetables       Include 1-2 servings of fruit daily.      1 serving of fruit includes ½ cup unsweetened applesauce, ½ medium banana, tennis ball size piece of fruit, 17 grapes, 1 cup melon, 1 cup strawberries, ¼ cup dried fruit     Include 2-3 servings of vegetables daily. 1 serving is 1 cup raw or ½ cup cooked.     Non-starchy vegetables include artichoke, asparagus, baby corn, bamboo shoots, beans: green/Italian/wax, bean sprouts, beets, broccoli, [place] sprouts, cabbage, carrots, cauliflower, celery, cucumber, eggplant, green onions or scallions, greens, jicama, leeks, mushrooms, okra, onions, pea pods, peppers, radishes, spinach, summer squash, tomatoes and salsa, turnips, vegetable juice cocktail, water chestnuts, zucchini      Remember these principles:   No liquids with meals. Do no drink 30 minutes before meals and wait 30 minutes to 1 hour after meals to start drinking.   Eat PROTEIN rich foods first at every meal or snack.   Sip on water, sugar-free beverages or non-fat milk throughout the day.  You will need to continue drinking at least 1 protein drink daily to meet protein needs.   100% fruit juice (no sugar added) is allowed, but limit to 4oz a day because it is high in calories and does not contain any protein.   Chew foods slowly; one meal should take 20-30 minutes.   Eat 5 meals (3 solid meals and 2 protein shakes) per day, without any additional snacking.   Stop eating as soon as you feel full.   Avoid using table sugar and foods made with refined sugar, which can trigger dumping syndrome.   Marinating meats with a low sugar marinade, adding low-fat salad dressing, or adding low calorie gravy (made from powder and water) can help meats to digest easier.     May add:  Raw veggies  Lettuce: start with baby spinach leaves  Plain, Unsalted Nuts and Seeds: almonds, peanuts, pistachios.  1 serving daily or less  Protein bars with 0-4 grams of sugar, see attached  "handout    Snacks: (100-200 calories; >5g protein)    - 1 low-fat cheese stick with 8 cherry tomatoes or 1 serving fresh fruit  - 4 thin slices fat-free turkey breast and 1 slice low-fat cheese  - 4 thin slices fat-free honey ham with wedge of melon  - 2 slices of turkey grajeda  - Boiled eggs (can buy at [employer] already boiled w/ shell removed)  - for convenience,  Bena read, snack, go (deli meat and cheese rolls)  - P3 packets (Protein packs w/ cheese, nuts, lean deli meat)  - MHP Fit and Lean Protein Pudding (find at [employer] - per 1 cup serving = 100 calories, 15 g protein, 0 g sugar)  - 6-8 edamame pods (equivalent to about 1/4 cup edamame without pods).   - 1/4 cup unsalted nuts with ½ cup fruit  - 6-oz container Dannon Light n Fit vanilla yogurt, topped with 1oz unsalted nuts         - apple, celery or baby carrots spread with 2 Tbsp PB2  - apple slices with 1 oz slice low-fat cheese  - Apple slices dipped in 2 Tbsp of PB2  - 2 Tbsp PB2 mixed in light or greek yogurt or sugar-free pudding  - celery, cucumber, bell pepper or baby carrots dipped in ¼ cup hummus bean spread   - celery, cucumber, baby carrots dipped in high protein greek yogurt (Mix 16 oz plain greek yogurt + 1 packet of hidden valley ranch dip mix)  - Eleno Links Beef Steak - 14g protein! (similar to beef jerky but very lean)  - 2 wedges Laughing Cow - Light Herb & Garlic Cheese with sliced cucumber or green bell pepper  - 1/2 cup low-fat cottage cheese with ¼ cup fruit or ¼ cup salsa  - 1/2 cup low fat cottage cheese with 10-15 cherry tomatoes  - 8 oz glass of FAIRLIFE fat free milk (13 g protein)  - 8 oz glass of FAIRLIFE fat free milk + 1 packet of sugar-free hot cocoa  - Add Atkins advantage Cafe Caramel shake to decaf coffee. Serve hot or blend with ice for "frappaccino" like drink  - RTD Protein drinks: Atkins, Low Carb Slim Fast, EAS light, Muscle Milk Light, etc.  - Homemade Protein drinks: [facility] Soy95, Isopure, Nectar, " UNJURY, Whey Gourmet, etc. Mix 1 scoop powder with 8oz skim/1% milk or light soymilk.  - Protein bars: Atkins, EAS, Pure Protein,  Quest, Think Thin, Detour, etc. Must have 0-4 grams sugar - Read the label.    ** Be CREATIVE. You can always snack on bites of grilled chicken or tuna salad made with low fat sandoval, if needed!

## 2019-12-12 NOTE — PROGRESS NOTES
Subjective:   Initial evaluation of heart transplant candidacy.    HPI:  Mr. Landaverde is a 39 y.o. year old Unknown male who has presents to be considered for advanced surgical options (LVAD/OHT). Patient has had heart failure since last year. Patient has been referred by Dr. Richards. Has been treated with GDMT, had repeat echo 03/16 which still demonstrated reduced LVEF, and therefore 04/30/16 underwent ICD placement. He is here after being [facility] in [place] for insurance issues. Have not seen him since 2016      Since last visit FC II III NYHA on entresto 97/103. COntinues to do well Had bariatric surgery (lost 55 pounds)    Echo January 2019    · Moderate left ventricular enlargement.  · Severely decreased left ventricular systolic function. The estimated ejection fraction is 20%  · Severe global hypokinetic wall motion.  · Grade I (mild) left ventricular diastolic dysfunction consistent with impaired relaxation.  · Moderate left atrial enlargement.  · Low normal right ventricular systolic function.  · Mild right atrial enlargement.  · Mild mitral regurgitation.  · Mild tricuspid regurgitation.  · Normal central venous pressure (3 mm Hg).  · The estimated PA systolic pressure is 32 mm Hg      2D echo with CFD  - Eccentric hypertrophy.   - Severely depressed left ventricular systolic function (EF 15-20%).   - Moderate left atrial enlargement.   - Biatrial enlargement.   - Normal right ventricular systolic function .   - The estimated PA systolic pressure is greater than 23 mmHg.   - Mild tricuspid regurgitation.   - Atrial septal aneurysm .      [facility]  AOPRES: 132/90 (104)  AOSAT: 97  FICKCI: 2.25  FICKCO: 5.62  PAPRES: 45/24 (31)  PASAT: 69  PVR: 2.31  PWPRES: 25/18 (18)  RAPRES: 10/8 (7)  RVPRES: 44/7, 7  SVR: 17.26  PWSAT: 95     CPX   - The respiratory exchange ratio (RER) was 1.13, suggesting an adequate effort.  - The breathing reserve is calculated at 33%, which is normal. Oxygen saturation with exercise remained  normal.   - The PkVO2 was 19.2 ml/kg/min which is 47% of predicted equating to a functional capacity of 5.5 METS indicating severe functional impairment.   - The anaerobic threshold (AT), which occurred at a heart rate of 125bpm, was 12.8 ml/kg/min, which is 31% of the predicted VO2 and is reduced.   - The PkVO2 Lean was 27.24 ml/kg of lean body weight/min indicating a good prognosis in heart failure.   - The VE/VCO2 decreased by -31% from rest to AT. The VE/VCO2 [place] was 22.1.  The Resting PetCO2 was 43.7.     Past Medical History:   Diagnosis Date    Allergy     Cardiomyopathy     CHF (congestive heart failure)     Depression     Hypertension     Obesity      Past Surgical History:   Procedure Laterality Date    defibrillator      ESOPHAGOGASTRODUODENOSCOPY N/A 9/26/2019    Procedure: EGD (ESOPHAGOGASTRODUODENOSCOPY);  Surgeon: Erik Blas Jr., MD;  Location: [address];  Service: General;  Laterality: N/A;    FRACTURE SURGERY Left     ankle    LAPAROSCOPIC SLEEVE GASTRECTOMY N/A 9/26/2019    Procedure: GASTRECTOMY, SLEEVE, LAPAROSCOPIC, with intraop EGD 57267;  Surgeon: Erik Blas Jr., MD;  Location: [address];  Service: General;  Laterality: N/A;    VASECTOMY         Family History   Problem Relation Age of Onset    Hypertension Mother     No Known Problems Father     Hypertension Brother     No Known Problems Daughter     No Known Problems Son        Review of Systems   Constitution: Negative for chills, decreased appetite, diaphoresis, fever, malaise/fatigue, weight gain and weight loss.   HENT: Negative for congestion.    Eyes: Negative for blurred vision and visual disturbance.   Cardiovascular: Negative for chest pain, dyspnea on exertion, irregular heartbeat, leg swelling, near-syncope, orthopnea, palpitations, paroxysmal nocturnal dyspnea and syncope.   Respiratory: Negative for cough, shortness of breath, sleep disturbances due to breathing, snoring and  "wheezing.    Hematologic/Lymphatic: Negative for bleeding problem. Does not bruise/bleed easily.   Skin: Negative for poor wound healing and rash.   Musculoskeletal: Negative for arthritis, joint pain and muscle weakness.   Gastrointestinal: Negative for bloating, abdominal pain, constipation, diarrhea, hematemesis, hematochezia, melena, nausea and vomiting.   Genitourinary: Negative for frequency and urgency.   Neurological: Positive for excessive daytime sleepiness. Negative for difficulty with concentration, dizziness, headaches, light-headedness and weakness.   Psychiatric/Behavioral: Negative for depression. The patient does not have insomnia and is not nervous/anxious.        Objective:   Blood pressure 116/84, pulse 83, height 5' 9" (1.753 m), weight (!) 143.7 kg (316 lb 12.8 oz).body mass index is 46.78 kg/m².    Physical Exam   Constitutional: He is oriented to person, place, and time. He appears well-developed and well-nourished. No distress.   HENT:   Head: Normocephalic and atraumatic.   Nose: Nose normal.   Mouth/Throat: Oropharynx is clear and moist. No oropharyngeal exudate.   Eyes: Pupils are equal, round, and reactive to light. Conjunctivae and EOM are normal. No scleral icterus.   Neck: Normal range of motion. Neck supple. No JVD present. No tracheal deviation present. No thyromegaly present.   Cardiovascular: Normal rate, regular rhythm, S1 normal, S2 normal and normal heart sounds. Exam reveals no gallop and no friction rub.   No murmur heard.  Pulmonary/Chest: Effort normal and breath sounds normal. No respiratory distress. He has no wheezes. He has no rales. He exhibits no tenderness.   Abdominal: Soft. Bowel sounds are normal. He exhibits no distension and no mass. There is no tenderness. There is no rebound and no guarding.   Obese abdomen   Musculoskeletal: Normal range of motion. He exhibits edema (trace pitting edema ). He exhibits no tenderness.   Neurological: He is alert and oriented to " person, place, and time. Coordination normal.   Skin: Skin is warm and dry. No rash noted. He is not diaphoretic. No erythema. No pallor.   Psychiatric: He has a normal mood and affect. His behavior is normal. Judgment and thought content normal.   Nursing note and vitals reviewed.      Labs:      Chemistry        Component Value Date/Time     10/09/2019 0824    K 3.5 10/09/2019 0824    CL 99 10/09/2019 0824    CO2 28 10/09/2019 0824    BUN 11 10/09/2019 0824    CREATININE 0.8 10/09/2019 0824     (H) 10/09/2019 0824        Component Value Date/Time    CALCIUM 9.5 10/09/2019 0824    ALKPHOS 87 10/09/2019 0824    AST 17 10/09/2019 0824    ALT 17 10/09/2019 0824    BILITOT 0.2 10/09/2019 0824          Magnesium   Date Value Ref Range Status   09/27/2019 1.8 1.6 - 2.6 mg/dL Final     Lab Results   Component Value Date    WBC 10.42 10/09/2019    HGB 13.5 (L) 10/09/2019    HCT 41.3 10/09/2019    MCV 88 10/09/2019     10/09/2019     BNP   Date Value Ref Range Status   04/11/2019 71 0 - 99 pg/mL Final     Comment:     Values of less than 100 pg/ml are consistent with non-CHF populations.   01/11/2019 46 0 - 99 pg/mL Final     Comment:     Values of less than 100 pg/ml are consistent with non-CHF populations.   07/14/2016 154 (H) 0 - 99 pg/mL Final     Comment:     Values of less than 100 pg/ml are consistent with non-CHF populations.     Labs were reviewed with the patient.    Assessment:      1. S/P ICD (internal cardiac defibrillator) procedure    2. Nonischemic dilated cardiomyopathy    3. Chronic systolic congestive heart failure    4. Benign essential hypertension    5. Morbid obesity due to excess calories    6. ORIN (obstructive sleep apnea)        Plan:   BMI 56 I have explained to him that this is a contraindication for heart transplant or LVAD we will continue GMT. On 97/103 of entresto    Continue weight loss after bariatric    Needs 2 D echo        RTC 6 months      Patient is now NYHA II ACC  stage C  Recommend 2 gram sodium restriction and 1500cc fluid restriction.  Encourage physical activity with graded exercise program.  Requested patient to weigh themselves daily, and to notify us if their weight increases by more than 3 lbs in 1 day or 5 lbs in 1 week.     Transplant Candidacy: Patient is a 39 y.o. year old male with heart failure is being seen for possible LVAD. In my opinion, he is  a marginal LVAD candidate. Patient did not meet with MCS and/or pre-transplant coordinator at the end of this visit for workup. he is scheduled for a CPX in 3 months  [facility] Patient Status  Functional Status: 80% - Normal activity with effort: some symptoms of disease  Physical Capacity: No Limitations  Working for Income: yes  If yes, working activity level: Working, Part Time vs. Full Time Unknown    Israel Mariee MD

## 2019-12-16 LAB — VIT B1 BLD-MCNC: 76 UG/L (ref 38–122)

## 2020-03-02 RX ORDER — URSODIOL 500 MG/1
TABLET, FILM COATED ORAL
Qty: 30 TABLET | Refills: 0 | Status: SHIPPED | OUTPATIENT
Start: 2020-03-02 | End: 2022-06-24 | Stop reason: ALTCHOICE

## 2020-03-11 ENCOUNTER — TELEPHONE (OUTPATIENT)
Dept: BARIATRICS | Facility: CLINIC | Age: 40
End: 2020-03-11

## 2020-03-11 NOTE — TELEPHONE ENCOUNTER
Provider sick.  Called patient to reschedule with another provider tomorrow or reschedule for another date.  Patient requested appt to be rescheduled to next week.  appt rescheduled.  Patient aware of date/time/location of appt.

## 2020-03-16 ENCOUNTER — TELEPHONE (OUTPATIENT)
Dept: BARIATRICS | Facility: CLINIC | Age: 40
End: 2020-03-16

## 2020-03-16 NOTE — PROGRESS NOTES
BARIATRIC FOLLOW UP:    Chief Complaint   Patient presents with    Follow-up     HISTORY OF PRESENT ILLNESS: Trell Landaverde is a 39 y.o. male. This is follow up for s/p lap sleeve with Dr. Blas on 9/26/2019.  he is doing well and tolerating the diet without difficulty. He is tolerating diet well and denies any heartburn, dysphagia, or globus.   He does experience constipation, hard stool. He is taking Miralax 1 cap daily. 64+ oz fluid daily.   He also reports he has dizziness with getting up. He has reported to IM MD and has scheduled follow-up in 2 days to discuss additionally. Home -80s, though this is taken maybe once weekly.    Feels hungry often. 4 small meals daily. Snacks on Nutri Grain bars - 3 a day. Lean Body protein shakes daily on work out days. 3 meals- 1/2 slice cheese toast, sausage, 4-5 oz steak, asparagus    Running 2 miles in AM, lifts wts at night, 5 days a week.   Taking all vitamins    Reports current is 295 pounds.       Review of Systems   Constitutional: Negative for chills, fever and malaise/fatigue.   Eyes: Negative for blurred vision and double vision.   Respiratory: Negative for cough, shortness of breath and wheezing.    Cardiovascular: Negative for chest pain.   Gastrointestinal: Positive for constipation. Negative for abdominal pain, blood in stool, diarrhea, heartburn, nausea and vomiting.        Denies dysphagia or globus sensation   Neurological: Negative for dizziness, tingling and headaches.   Psychiatric/Behavioral: Negative for depression, memory loss and suicidal ideas. The patient is not nervous/anxious.        Physical Exam   Constitutional: He is oriented to person, place, and time. He appears well-developed and well-nourished. No distress.   HENT:   Head: Normocephalic and atraumatic.   Eyes: Right eye exhibits no discharge. Left eye exhibits no discharge. No scleral icterus.   Abdominal: Soft. Bowel sounds are normal. He exhibits no distension and no mass.  There is no tenderness. There is no rebound and no guarding. No hernia.   By inspection: well appearing abdomen, WHSS, no bulge, no tenderness on pt palpation   Neurological: He is alert and oriented to person, place, and time.   Skin: Skin is warm and dry. He is not diaphoretic.   On inspection   Psychiatric: He has a normal mood and affect. His behavior is normal. Judgment and thought content normal.   Nursing note and vitals reviewed.    ASSESSMENT:  - Constipaton  - s/p sleeve gastrectomy .  - Estimated goal weight is 50% EWL  - Co-morbidities: HTN, CHF, ICD, ORIN  - Continued Weight loss  - Good Exercise regimen  - Good Vitamin Regimen  - Fair Diet    PLAN:  - Add in OTC Colace. May increase Miralax to 2 caps if Colace ineffective. Advised to call for persistent issues.   - Excess CHO. Discussed better snack bar options to decrease total CHO intake which may aid with hunger management. Advised to call if having appetite concerns after changes.   - May hold exercise until he receives additional direction from his IM provider regarding BP medications. Otherwise, regular exercise and adherence to bariatric diet to achieve maximum weight loss.  - Advised daily BP and when feeling dizzy. Report to IM and cards for additional direction.   - Full bariatric vitamin regimen  - Ursodiol complete  - No NSAIDs, Tylenol for pain.  - RTC per post op schedule.  - Call the office for any issues.   - Check labs as scheduled- per COVID19 process.    25 minute visit, over 50% of time spent counseling patient face to face on diet, exercise, and weight loss.  The patient location is: [place]  The chief complaint leading to consultation is: follow-up  Visit type: Virtual visit with synchronous audio and video  Total time spent with patient: 25 minutes  Each patient to whom he or she provides medical services by telemedicine is:  (1) informed of the relationship between the physician and patient and the respective role of any other health  care provider with respect to management of the patient; and (2) notified that he or she may decline to receive medical services by telemedicine and may withdraw from such care at any time.

## 2020-03-16 NOTE — TELEPHONE ENCOUNTER
Called patient regarding appointment for tomorrow. Patient interested in telemedicine visit. Patient states he will be in [place]. Appointment scheduled for tomorrow.

## 2020-03-17 ENCOUNTER — OFFICE VISIT (OUTPATIENT)
Dept: BARIATRICS | Facility: CLINIC | Age: 40
End: 2020-03-17
Payer: MEDICARE

## 2020-03-17 DIAGNOSIS — Z71.9 HEALTH COUNSELING: ICD-10-CM

## 2020-03-17 DIAGNOSIS — Z98.84 S/P LAPAROSCOPIC SLEEVE GASTRECTOMY: Primary | ICD-10-CM

## 2020-03-17 DIAGNOSIS — R63.4 WEIGHT LOSS: ICD-10-CM

## 2020-03-17 PROCEDURE — 99213 PR OFFICE/OUTPT VISIT, EST, LEVL III, 20-29 MIN: ICD-10-PCS | Mod: 95,,, | Performed by: NURSE PRACTITIONER

## 2020-03-17 PROCEDURE — 99213 OFFICE O/P EST LOW 20 MIN: CPT | Mod: 95,,, | Performed by: NURSE PRACTITIONER

## 2020-03-17 RX ORDER — UBIDECARENONE 75 MG
500 CAPSULE ORAL DAILY
COMMUNITY
End: 2021-02-08

## 2020-03-26 RX ORDER — URSODIOL 500 MG/1
TABLET, FILM COATED ORAL
Qty: 30 TABLET | Refills: 0 | OUTPATIENT
Start: 2020-03-26

## 2020-05-20 ENCOUNTER — TELEPHONE (OUTPATIENT)
Dept: BARIATRICS | Facility: CLINIC | Age: 40
End: 2020-05-20

## 2021-01-08 DIAGNOSIS — I50.42 CHRONIC COMBINED SYSTOLIC AND DIASTOLIC CONGESTIVE HEART FAILURE, NYHA CLASS 3: Primary | ICD-10-CM

## 2021-01-17 DIAGNOSIS — E55.9 VITAMIN D DEFICIENCY: ICD-10-CM

## 2021-01-17 DIAGNOSIS — Z95.810 S/P ICD (INTERNAL CARDIAC DEFIBRILLATOR) PROCEDURE: ICD-10-CM

## 2021-01-17 DIAGNOSIS — Z79.899 LONG TERM USE OF DRUG: ICD-10-CM

## 2021-01-17 DIAGNOSIS — I42.0 NONISCHEMIC DILATED CARDIOMYOPATHY: ICD-10-CM

## 2021-01-17 DIAGNOSIS — D52.9 ANEMIA DUE TO FOLIC ACID DEFICIENCY, UNSPECIFIED DEFICIENCY TYPE: ICD-10-CM

## 2021-01-17 DIAGNOSIS — D51.9 ANEMIA DUE TO VITAMIN B12 DEFICIENCY, UNSPECIFIED B12 DEFICIENCY TYPE: ICD-10-CM

## 2021-01-17 DIAGNOSIS — Z98.84 S/P LAPAROSCOPIC SLEEVE GASTRECTOMY: Primary | ICD-10-CM

## 2021-01-17 DIAGNOSIS — I10 BENIGN ESSENTIAL HYPERTENSION: ICD-10-CM

## 2021-01-17 DIAGNOSIS — G47.33 OSA (OBSTRUCTIVE SLEEP APNEA): ICD-10-CM

## 2021-01-17 DIAGNOSIS — I50.42 CHRONIC COMBINED SYSTOLIC AND DIASTOLIC CONGESTIVE HEART FAILURE, NYHA CLASS 3: ICD-10-CM

## 2021-02-08 ENCOUNTER — OFFICE VISIT (OUTPATIENT)
Dept: TRANSPLANT | Facility: CLINIC | Age: 41
End: 2021-02-08
Payer: MEDICARE

## 2021-02-08 ENCOUNTER — LAB VISIT (OUTPATIENT)
Dept: LAB | Facility: HOSPITAL | Age: 41
End: 2021-02-08
Attending: INTERNAL MEDICINE
Payer: MEDICARE

## 2021-02-08 ENCOUNTER — OFFICE VISIT (OUTPATIENT)
Dept: BARIATRICS | Facility: CLINIC | Age: 41
End: 2021-02-08
Payer: MEDICARE

## 2021-02-08 VITALS
DIASTOLIC BLOOD PRESSURE: 66 MMHG | HEIGHT: 69 IN | BODY MASS INDEX: 39.96 KG/M2 | SYSTOLIC BLOOD PRESSURE: 103 MMHG | WEIGHT: 269.81 LBS | HEART RATE: 92 BPM

## 2021-02-08 VITALS
HEART RATE: 69 BPM | SYSTOLIC BLOOD PRESSURE: 133 MMHG | BODY MASS INDEX: 39.64 KG/M2 | OXYGEN SATURATION: 96 % | DIASTOLIC BLOOD PRESSURE: 73 MMHG | HEIGHT: 69 IN | WEIGHT: 267.63 LBS

## 2021-02-08 DIAGNOSIS — Z95.810 S/P ICD (INTERNAL CARDIAC DEFIBRILLATOR) PROCEDURE: Primary | ICD-10-CM

## 2021-02-08 DIAGNOSIS — E66.01 MORBID OBESITY DUE TO EXCESS CALORIES: ICD-10-CM

## 2021-02-08 DIAGNOSIS — D51.9 ANEMIA DUE TO VITAMIN B12 DEFICIENCY, UNSPECIFIED B12 DEFICIENCY TYPE: ICD-10-CM

## 2021-02-08 DIAGNOSIS — R63.4 WEIGHT LOSS: Primary | ICD-10-CM

## 2021-02-08 DIAGNOSIS — I10 BENIGN ESSENTIAL HYPERTENSION: ICD-10-CM

## 2021-02-08 DIAGNOSIS — Z95.810 S/P ICD (INTERNAL CARDIAC DEFIBRILLATOR) PROCEDURE: ICD-10-CM

## 2021-02-08 DIAGNOSIS — Z79.899 LONG TERM USE OF DRUG: ICD-10-CM

## 2021-02-08 DIAGNOSIS — I50.42 CHRONIC COMBINED SYSTOLIC AND DIASTOLIC CONGESTIVE HEART FAILURE, NYHA CLASS 3: ICD-10-CM

## 2021-02-08 DIAGNOSIS — I42.0 NONISCHEMIC DILATED CARDIOMYOPATHY: ICD-10-CM

## 2021-02-08 DIAGNOSIS — G47.33 OSA (OBSTRUCTIVE SLEEP APNEA): ICD-10-CM

## 2021-02-08 DIAGNOSIS — I50.40 COMBINED SYSTOLIC AND DIASTOLIC CONGESTIVE HEART FAILURE, NYHA CLASS 3, UNSPECIFIED CONGESTIVE HEART FAILURE CHRONICITY: ICD-10-CM

## 2021-02-08 DIAGNOSIS — D52.9 ANEMIA DUE TO FOLIC ACID DEFICIENCY, UNSPECIFIED DEFICIENCY TYPE: ICD-10-CM

## 2021-02-08 DIAGNOSIS — I50.22 CHRONIC SYSTOLIC CONGESTIVE HEART FAILURE: ICD-10-CM

## 2021-02-08 DIAGNOSIS — Z98.84 S/P LAPAROSCOPIC SLEEVE GASTRECTOMY: ICD-10-CM

## 2021-02-08 DIAGNOSIS — E55.9 VITAMIN D DEFICIENCY: ICD-10-CM

## 2021-02-08 LAB
25(OH)D3+25(OH)D2 SERPL-MCNC: 48 NG/ML (ref 30–96)
ALBUMIN SERPL BCP-MCNC: 3.8 G/DL (ref 3.5–5.2)
ALP SERPL-CCNC: 67 U/L (ref 55–135)
ALT SERPL W/O P-5'-P-CCNC: 15 U/L (ref 10–44)
ANION GAP SERPL CALC-SCNC: 7 MMOL/L (ref 8–16)
AST SERPL-CCNC: 15 U/L (ref 10–40)
BASOPHILS # BLD AUTO: 0.03 K/UL (ref 0–0.2)
BASOPHILS NFR BLD: 0.3 % (ref 0–1.9)
BILIRUB SERPL-MCNC: 0.4 MG/DL (ref 0.1–1)
BNP SERPL-MCNC: 59 PG/ML (ref 0–99)
BUN SERPL-MCNC: 16 MG/DL (ref 6–20)
CALCIUM SERPL-MCNC: 9.3 MG/DL (ref 8.7–10.5)
CHLORIDE SERPL-SCNC: 102 MMOL/L (ref 95–110)
CHOLEST SERPL-MCNC: 166 MG/DL (ref 120–199)
CHOLEST/HDLC SERPL: 3.4 {RATIO} (ref 2–5)
CO2 SERPL-SCNC: 30 MMOL/L (ref 23–29)
CREAT SERPL-MCNC: 0.9 MG/DL (ref 0.5–1.4)
DIFFERENTIAL METHOD: NORMAL
EOSINOPHIL # BLD AUTO: 0.1 K/UL (ref 0–0.5)
EOSINOPHIL NFR BLD: 0.7 % (ref 0–8)
ERYTHROCYTE [DISTWIDTH] IN BLOOD BY AUTOMATED COUNT: 13.9 % (ref 11.5–14.5)
EST. GFR  (AFRICAN AMERICAN): >60 ML/MIN/1.73 M^2
EST. GFR  (NON AFRICAN AMERICAN): >60 ML/MIN/1.73 M^2
GLUCOSE SERPL-MCNC: 99 MG/DL (ref 70–110)
HCT VFR BLD AUTO: 48.5 % (ref 40–54)
HDLC SERPL-MCNC: 49 MG/DL (ref 40–75)
HDLC SERPL: 29.5 % (ref 20–50)
HGB BLD-MCNC: 15.5 G/DL (ref 14–18)
IMM GRANULOCYTES # BLD AUTO: 0.02 K/UL (ref 0–0.04)
IMM GRANULOCYTES NFR BLD AUTO: 0.2 % (ref 0–0.5)
IRON SERPL-MCNC: 83 UG/DL (ref 45–160)
LDLC SERPL CALC-MCNC: 102.6 MG/DL (ref 63–159)
LYMPHOCYTES # BLD AUTO: 3.2 K/UL (ref 1–4.8)
LYMPHOCYTES NFR BLD: 36.4 % (ref 18–48)
MAGNESIUM SERPL-MCNC: 1.9 MG/DL (ref 1.6–2.6)
MCH RBC QN AUTO: 28.8 PG (ref 27–31)
MCHC RBC AUTO-ENTMCNC: 32 G/DL (ref 32–36)
MCV RBC AUTO: 90 FL (ref 82–98)
MONOCYTES # BLD AUTO: 0.6 K/UL (ref 0.3–1)
MONOCYTES NFR BLD: 6.2 % (ref 4–15)
NEUTROPHILS # BLD AUTO: 5 K/UL (ref 1.8–7.7)
NEUTROPHILS NFR BLD: 56.2 % (ref 38–73)
NONHDLC SERPL-MCNC: 117 MG/DL
NRBC BLD-RTO: 0 /100 WBC
PLATELET # BLD AUTO: 223 K/UL (ref 150–350)
PMV BLD AUTO: 10.1 FL (ref 9.2–12.9)
POTASSIUM SERPL-SCNC: 4.6 MMOL/L (ref 3.5–5.1)
PROT SERPL-MCNC: 7.4 G/DL (ref 6–8.4)
RBC # BLD AUTO: 5.39 M/UL (ref 4.6–6.2)
SATURATED IRON: 23 % (ref 20–50)
SODIUM SERPL-SCNC: 139 MMOL/L (ref 136–145)
TOTAL IRON BINDING CAPACITY: 363 UG/DL (ref 250–450)
TRANSFERRIN SERPL-MCNC: 245 MG/DL (ref 200–375)
TRIGL SERPL-MCNC: 72 MG/DL (ref 30–150)
VIT B12 SERPL-MCNC: 1466 PG/ML (ref 210–950)
WBC # BLD AUTO: 8.87 K/UL (ref 3.9–12.7)

## 2021-02-08 PROCEDURE — 3075F PR MOST RECENT SYSTOLIC BLOOD PRESS GE 130-139MM HG: ICD-10-PCS | Mod: CPTII,S$GLB,, | Performed by: PHYSICIAN ASSISTANT

## 2021-02-08 PROCEDURE — 99213 PR OFFICE/OUTPT VISIT, EST, LEVL III, 20-29 MIN: ICD-10-PCS | Mod: S$GLB,,, | Performed by: PHYSICIAN ASSISTANT

## 2021-02-08 PROCEDURE — 85025 COMPLETE CBC W/AUTO DIFF WBC: CPT

## 2021-02-08 PROCEDURE — 3008F BODY MASS INDEX DOCD: CPT | Mod: CPTII,S$GLB,, | Performed by: PHYSICIAN ASSISTANT

## 2021-02-08 PROCEDURE — 83880 ASSAY OF NATRIURETIC PEPTIDE: CPT

## 2021-02-08 PROCEDURE — 99999 PR PBB SHADOW E&M-EST. PATIENT-LVL III: CPT | Mod: PBBFAC,,, | Performed by: INTERNAL MEDICINE

## 2021-02-08 PROCEDURE — 3008F PR BODY MASS INDEX (BMI) DOCUMENTED: ICD-10-PCS | Mod: CPTII,S$GLB,, | Performed by: PHYSICIAN ASSISTANT

## 2021-02-08 PROCEDURE — 82607 VITAMIN B-12: CPT

## 2021-02-08 PROCEDURE — 99999 PR PBB SHADOW E&M-EST. PATIENT-LVL III: ICD-10-PCS | Mod: PBBFAC,,, | Performed by: INTERNAL MEDICINE

## 2021-02-08 PROCEDURE — 80061 LIPID PANEL: CPT

## 2021-02-08 PROCEDURE — 1126F AMNT PAIN NOTED NONE PRSNT: CPT | Mod: S$GLB,,, | Performed by: INTERNAL MEDICINE

## 2021-02-08 PROCEDURE — 3008F BODY MASS INDEX DOCD: CPT | Mod: CPTII,S$GLB,, | Performed by: INTERNAL MEDICINE

## 2021-02-08 PROCEDURE — 82306 VITAMIN D 25 HYDROXY: CPT

## 2021-02-08 PROCEDURE — 3074F SYST BP LT 130 MM HG: CPT | Mod: CPTII,S$GLB,, | Performed by: INTERNAL MEDICINE

## 2021-02-08 PROCEDURE — 83540 ASSAY OF IRON: CPT

## 2021-02-08 PROCEDURE — 1126F PR PAIN SEVERITY QUANTIFIED, NO PAIN PRESENT: ICD-10-PCS | Mod: S$GLB,,, | Performed by: INTERNAL MEDICINE

## 2021-02-08 PROCEDURE — 3075F SYST BP GE 130 - 139MM HG: CPT | Mod: CPTII,S$GLB,, | Performed by: PHYSICIAN ASSISTANT

## 2021-02-08 PROCEDURE — 84425 ASSAY OF VITAMIN B-1: CPT

## 2021-02-08 PROCEDURE — 3008F PR BODY MASS INDEX (BMI) DOCUMENTED: ICD-10-PCS | Mod: CPTII,S$GLB,, | Performed by: INTERNAL MEDICINE

## 2021-02-08 PROCEDURE — 1126F AMNT PAIN NOTED NONE PRSNT: CPT | Mod: S$GLB,,, | Performed by: PHYSICIAN ASSISTANT

## 2021-02-08 PROCEDURE — 3078F DIAST BP <80 MM HG: CPT | Mod: CPTII,S$GLB,, | Performed by: INTERNAL MEDICINE

## 2021-02-08 PROCEDURE — 83735 ASSAY OF MAGNESIUM: CPT

## 2021-02-08 PROCEDURE — 36415 COLL VENOUS BLD VENIPUNCTURE: CPT

## 2021-02-08 PROCEDURE — 3074F PR MOST RECENT SYSTOLIC BLOOD PRESSURE < 130 MM HG: ICD-10-PCS | Mod: CPTII,S$GLB,, | Performed by: INTERNAL MEDICINE

## 2021-02-08 PROCEDURE — 3078F DIAST BP <80 MM HG: CPT | Mod: CPTII,S$GLB,, | Performed by: PHYSICIAN ASSISTANT

## 2021-02-08 PROCEDURE — 99215 PR OFFICE/OUTPT VISIT, EST, LEVL V, 40-54 MIN: ICD-10-PCS | Mod: S$GLB,,, | Performed by: INTERNAL MEDICINE

## 2021-02-08 PROCEDURE — 99215 OFFICE O/P EST HI 40 MIN: CPT | Mod: S$GLB,,, | Performed by: INTERNAL MEDICINE

## 2021-02-08 PROCEDURE — 3078F PR MOST RECENT DIASTOLIC BLOOD PRESSURE < 80 MM HG: ICD-10-PCS | Mod: CPTII,S$GLB,, | Performed by: PHYSICIAN ASSISTANT

## 2021-02-08 PROCEDURE — 99999 PR PBB SHADOW E&M-EST. PATIENT-LVL V: ICD-10-PCS | Mod: PBBFAC,,, | Performed by: PHYSICIAN ASSISTANT

## 2021-02-08 PROCEDURE — 3078F PR MOST RECENT DIASTOLIC BLOOD PRESSURE < 80 MM HG: ICD-10-PCS | Mod: CPTII,S$GLB,, | Performed by: INTERNAL MEDICINE

## 2021-02-08 PROCEDURE — 80053 COMPREHEN METABOLIC PANEL: CPT

## 2021-02-08 PROCEDURE — 99999 PR PBB SHADOW E&M-EST. PATIENT-LVL V: CPT | Mod: PBBFAC,,, | Performed by: PHYSICIAN ASSISTANT

## 2021-02-08 PROCEDURE — 1126F PR PAIN SEVERITY QUANTIFIED, NO PAIN PRESENT: ICD-10-PCS | Mod: S$GLB,,, | Performed by: PHYSICIAN ASSISTANT

## 2021-02-08 PROCEDURE — 99213 OFFICE O/P EST LOW 20 MIN: CPT | Mod: S$GLB,,, | Performed by: PHYSICIAN ASSISTANT

## 2021-02-08 RX ORDER — DIAZEPAM 2 MG/1
2 TABLET ORAL EVERY 12 HOURS PRN
Qty: 40 TABLET | Refills: 1 | Status: SHIPPED | OUTPATIENT
Start: 2021-02-08 | End: 2021-06-09

## 2021-02-08 RX ORDER — DULOXETIN HYDROCHLORIDE 60 MG/1
CAPSULE, DELAYED RELEASE ORAL
COMMUNITY
Start: 2021-01-07

## 2021-02-12 LAB — VIT B1 BLD-MCNC: 56 UG/L (ref 38–122)

## 2021-08-09 ENCOUNTER — OFFICE VISIT (OUTPATIENT)
Dept: TRANSPLANT | Facility: CLINIC | Age: 41
End: 2021-08-09
Payer: MEDICARE

## 2021-08-09 DIAGNOSIS — Z95.810 S/P ICD (INTERNAL CARDIAC DEFIBRILLATOR) PROCEDURE: ICD-10-CM

## 2021-08-09 DIAGNOSIS — I42.0 NONISCHEMIC DILATED CARDIOMYOPATHY: Primary | ICD-10-CM

## 2021-08-09 DIAGNOSIS — E66.01 MORBID OBESITY DUE TO EXCESS CALORIES: ICD-10-CM

## 2021-08-09 DIAGNOSIS — I50.42 CHRONIC COMBINED SYSTOLIC AND DIASTOLIC CONGESTIVE HEART FAILURE, NYHA CLASS 3: ICD-10-CM

## 2021-08-09 DIAGNOSIS — G47.33 OSA (OBSTRUCTIVE SLEEP APNEA): ICD-10-CM

## 2021-08-09 PROCEDURE — 99213 PR OFFICE/OUTPT VISIT, EST, LEVL III, 20-29 MIN: ICD-10-PCS | Mod: GT,,, | Performed by: INTERNAL MEDICINE

## 2021-08-09 PROCEDURE — 99213 OFFICE O/P EST LOW 20 MIN: CPT | Mod: GT,,, | Performed by: INTERNAL MEDICINE

## 2021-08-09 RX ORDER — DIAZEPAM 2 MG/1
2 TABLET ORAL EVERY 6 HOURS PRN
Qty: 40 TABLET | Refills: 1 | Status: SHIPPED | OUTPATIENT
Start: 2021-08-09 | End: 2021-09-09 | Stop reason: SDUPTHER

## 2021-09-08 ENCOUNTER — PATIENT MESSAGE (OUTPATIENT)
Dept: TRANSPLANT | Facility: CLINIC | Age: 41
End: 2021-09-08

## 2021-09-09 ENCOUNTER — PATIENT MESSAGE (OUTPATIENT)
Dept: TRANSPLANT | Facility: CLINIC | Age: 41
End: 2021-09-09

## 2021-09-09 DIAGNOSIS — F41.9 ANXIETY: Primary | ICD-10-CM

## 2021-09-13 RX ORDER — DIAZEPAM 2 MG/1
2 TABLET ORAL EVERY 6 HOURS PRN
Qty: 40 TABLET | Refills: 1 | Status: SHIPPED | OUTPATIENT
Start: 2021-09-13

## 2021-10-18 ENCOUNTER — PATIENT MESSAGE (OUTPATIENT)
Dept: TRANSPLANT | Facility: CLINIC | Age: 41
End: 2021-10-18
Payer: MEDICARE

## 2021-12-28 ENCOUNTER — PATIENT MESSAGE (OUTPATIENT)
Dept: TRANSPLANT | Facility: CLINIC | Age: 41
End: 2021-12-28
Payer: MEDICARE

## 2021-12-29 DIAGNOSIS — I42.0 NONISCHEMIC DILATED CARDIOMYOPATHY: Primary | ICD-10-CM

## 2022-03-07 ENCOUNTER — PATIENT MESSAGE (OUTPATIENT)
Dept: BARIATRICS | Facility: CLINIC | Age: 42
End: 2022-03-07
Payer: MEDICARE

## 2022-04-01 ENCOUNTER — PATIENT MESSAGE (OUTPATIENT)
Dept: BARIATRICS | Facility: CLINIC | Age: 42
End: 2022-04-01
Payer: MEDICARE

## 2022-04-07 ENCOUNTER — TELEPHONE (OUTPATIENT)
Dept: BARIATRICS | Facility: CLINIC | Age: 42
End: 2022-04-07
Payer: MEDICARE

## 2022-04-07 ENCOUNTER — PATIENT MESSAGE (OUTPATIENT)
Dept: BARIATRICS | Facility: CLINIC | Age: 42
End: 2022-04-07
Payer: MEDICARE

## 2022-04-12 ENCOUNTER — PATIENT MESSAGE (OUTPATIENT)
Dept: BARIATRICS | Facility: CLINIC | Age: 42
End: 2022-04-12
Payer: MEDICARE

## 2022-05-05 ENCOUNTER — PATIENT MESSAGE (OUTPATIENT)
Dept: BARIATRICS | Facility: CLINIC | Age: 42
End: 2022-05-05
Payer: MEDICARE

## 2022-05-10 ENCOUNTER — PATIENT MESSAGE (OUTPATIENT)
Dept: BARIATRICS | Facility: CLINIC | Age: 42
End: 2022-05-10
Payer: MEDICARE

## 2022-06-10 ENCOUNTER — PATIENT MESSAGE (OUTPATIENT)
Dept: BARIATRICS | Facility: CLINIC | Age: 42
End: 2022-06-10
Payer: MEDICARE

## 2022-06-14 ENCOUNTER — PATIENT MESSAGE (OUTPATIENT)
Dept: BARIATRICS | Facility: CLINIC | Age: 42
End: 2022-06-14
Payer: MEDICARE

## 2022-06-24 ENCOUNTER — HOSPITAL ENCOUNTER (OUTPATIENT)
Dept: CARDIOLOGY | Facility: HOSPITAL | Age: 42
Discharge: HOME | End: 2022-06-24
Attending: INTERNAL MEDICINE
Payer: MEDICARE

## 2022-06-24 ENCOUNTER — OFFICE VISIT (OUTPATIENT)
Dept: BARIATRICS | Facility: CLINIC | Age: 42
End: 2022-06-24
Payer: MEDICARE

## 2022-06-24 ENCOUNTER — OFFICE VISIT (OUTPATIENT)
Dept: TRANSPLANT | Facility: CLINIC | Age: 42
End: 2022-06-24
Payer: MEDICARE

## 2022-06-24 ENCOUNTER — TELEPHONE (OUTPATIENT)
Dept: BARIATRICS | Facility: CLINIC | Age: 42
End: 2022-06-24
Payer: MEDICARE

## 2022-06-24 VITALS
SYSTOLIC BLOOD PRESSURE: 112 MMHG | HEIGHT: 69 IN | SYSTOLIC BLOOD PRESSURE: 130 MMHG | WEIGHT: 252.19 LBS | HEIGHT: 69 IN | DIASTOLIC BLOOD PRESSURE: 84 MMHG | HEART RATE: 70 BPM | HEART RATE: 64 BPM | WEIGHT: 253 LBS | BODY MASS INDEX: 37.47 KG/M2 | BODY MASS INDEX: 37.35 KG/M2 | DIASTOLIC BLOOD PRESSURE: 74 MMHG | OXYGEN SATURATION: 98 %

## 2022-06-24 VITALS
HEART RATE: 87 BPM | HEIGHT: 69 IN | WEIGHT: 253.06 LBS | BODY MASS INDEX: 37.48 KG/M2 | SYSTOLIC BLOOD PRESSURE: 112 MMHG | DIASTOLIC BLOOD PRESSURE: 84 MMHG

## 2022-06-24 DIAGNOSIS — E66.9 OBESITY: ICD-10-CM

## 2022-06-24 DIAGNOSIS — R00.2 PALPITATIONS: ICD-10-CM

## 2022-06-24 DIAGNOSIS — I42.0 NONISCHEMIC DILATED CARDIOMYOPATHY: ICD-10-CM

## 2022-06-24 DIAGNOSIS — Z95.810 IMPLANTABLE CARDIOVERTER-DEFIBRILLATOR (ICD) IN SITU: ICD-10-CM

## 2022-06-24 DIAGNOSIS — F41.9 ANXIETY: ICD-10-CM

## 2022-06-24 DIAGNOSIS — I50.22 CHRONIC SYSTOLIC CONGESTIVE HEART FAILURE: ICD-10-CM

## 2022-06-24 DIAGNOSIS — I10 BENIGN ESSENTIAL HYPERTENSION: ICD-10-CM

## 2022-06-24 DIAGNOSIS — I50.42 CHRONIC COMBINED SYSTOLIC AND DIASTOLIC CONGESTIVE HEART FAILURE, NYHA CLASS 3: Primary | ICD-10-CM

## 2022-06-24 DIAGNOSIS — G47.33 OSA (OBSTRUCTIVE SLEEP APNEA): ICD-10-CM

## 2022-06-24 DIAGNOSIS — E66.01 CLASS 2 SEVERE OBESITY DUE TO EXCESS CALORIES WITH SERIOUS COMORBIDITY AND BODY MASS INDEX (BMI) OF 37.0 TO 37.9 IN ADULT: ICD-10-CM

## 2022-06-24 DIAGNOSIS — Z98.84 S/P LAPAROSCOPIC SLEEVE GASTRECTOMY: Primary | ICD-10-CM

## 2022-06-24 DIAGNOSIS — D53.9 NUTRITIONAL ANEMIA, UNSPECIFIED: ICD-10-CM

## 2022-06-24 LAB
ASCENDING AORTA: 3.28 CM
AV INDEX (PROSTH): 0.66
AV MEAN GRADIENT: 6 MMHG
AV PEAK GRADIENT: 12 MMHG
AV VALVE AREA: 3.33 CM2
AV VELOCITY RATIO: 0.61
BSA FOR ECHO PROCEDURE: 2.36 M2
CV ECHO LV RWT: 0.21 CM
DOP CALC AO PEAK VEL: 1.7 M/S
DOP CALC AO VTI: 34.41 CM
DOP CALC LVOT AREA: 5.1 CM2
DOP CALC LVOT DIAMETER: 2.54 CM
DOP CALC LVOT PEAK VEL: 1.03 M/S
DOP CALC LVOT STROKE VOLUME: 114.51 CM3
DOP CALCLVOT PEAK VEL VTI: 22.61 CM
E WAVE DECELERATION TIME: 173.04 MSEC
E/A RATIO: 0.46
E/E' RATIO: 5.38 M/S
ECHO LV POSTERIOR WALL: 0.76 CM (ref 0.6–1.1)
EJECTION FRACTION: 25 %
FRACTIONAL SHORTENING: 11 % (ref 28–44)
INTERVENTRICULAR SEPTUM: 0.99 CM (ref 0.6–1.1)
IVRT: 144.62 MSEC
LA MAJOR: 6.05 CM
LA MINOR: 5.96 CM
LA WIDTH: 5.07 CM
LEFT ATRIUM SIZE: 4.66 CM
LEFT ATRIUM VOLUME INDEX MOD: 52.7 ML/M2
LEFT ATRIUM VOLUME INDEX: 52.9 ML/M2
LEFT ATRIUM VOLUME MOD: 120.25 CM3
LEFT ATRIUM VOLUME: 120.59 CM3
LEFT INTERNAL DIMENSION IN SYSTOLE: 6.4 CM (ref 2.1–4)
LEFT VENTRICLE DIASTOLIC VOLUME INDEX: 99.55 ML/M2
LEFT VENTRICLE DIASTOLIC VOLUME: 226.97 ML
LEFT VENTRICLE MASS INDEX: 126 G/M2
LEFT VENTRICLE SYSTOLIC VOLUME INDEX: 78.4 ML/M2
LEFT VENTRICLE SYSTOLIC VOLUME: 178.71 ML
LEFT VENTRICULAR INTERNAL DIMENSION IN DIASTOLE: 7.2 CM (ref 3.5–6)
LEFT VENTRICULAR MASS: 286.53 G
LV LATERAL E/E' RATIO: 4.38 M/S
LV SEPTAL E/E' RATIO: 7 M/S
MV A" WAVE DURATION": 17.98 MSEC
MV PEAK A VEL: 0.76 M/S
MV PEAK E VEL: 0.35 M/S
MV STENOSIS PRESSURE HALF TIME: 50.18 MS
MV VALVE AREA P 1/2 METHOD: 4.38 CM2
PISA TR MAX VEL: 2.19 M/S
PULM VEIN S/D RATIO: 1.31
PV PEAK D VEL: 0.29 M/S
PV PEAK S VEL: 0.38 M/S
RA MAJOR: 4.53 CM
RA PRESSURE: 3 MMHG
RA WIDTH: 3.84 CM
RIGHT VENTRICULAR END-DIASTOLIC DIMENSION: 3.82 CM
RV TISSUE DOPPLER FREE WALL SYSTOLIC VELOCITY 1 (APICAL 4 CHAMBER VIEW): 10.55 CM/S
SINUS: 3.51 CM
STJ: 3.18 CM
TDI LATERAL: 0.08 M/S
TDI SEPTAL: 0.05 M/S
TDI: 0.07 M/S
TR MAX PG: 19 MMHG
TRICUSPID ANNULAR PLANE SYSTOLIC EXCURSION: 1.86 CM
TV REST PULMONARY ARTERY PRESSURE: 22 MMHG

## 2022-06-24 PROCEDURE — 4010F PR ACE/ARB THEARPY RXD/TAKEN: ICD-10-PCS | Mod: CPTII,S$GLB,, | Performed by: INTERNAL MEDICINE

## 2022-06-24 PROCEDURE — 99214 OFFICE O/P EST MOD 30 MIN: CPT | Mod: S$GLB,,, | Performed by: NURSE PRACTITIONER

## 2022-06-24 PROCEDURE — 1160F PR REVIEW ALL MEDS BY PRESCRIBER/CLIN PHARMACIST DOCUMENTED: ICD-10-PCS | Mod: CPTII,S$GLB,, | Performed by: INTERNAL MEDICINE

## 2022-06-24 PROCEDURE — 3078F DIAST BP <80 MM HG: CPT | Mod: CPTII,S$GLB,, | Performed by: NURSE PRACTITIONER

## 2022-06-24 PROCEDURE — 4010F ACE/ARB THERAPY RXD/TAKEN: CPT | Mod: CPTII,S$GLB,, | Performed by: NURSE PRACTITIONER

## 2022-06-24 PROCEDURE — 25500020 PHARM REV CODE 255: Performed by: INTERNAL MEDICINE

## 2022-06-24 PROCEDURE — 1160F RVW MEDS BY RX/DR IN RCRD: CPT | Mod: CPTII,S$GLB,, | Performed by: NURSE PRACTITIONER

## 2022-06-24 PROCEDURE — 99999 PR PBB SHADOW E&M-EST. PATIENT-LVL V: CPT | Mod: PBBFAC,,, | Performed by: NURSE PRACTITIONER

## 2022-06-24 PROCEDURE — 4010F PR ACE/ARB THEARPY RXD/TAKEN: ICD-10-PCS | Mod: CPTII,S$GLB,, | Performed by: NURSE PRACTITIONER

## 2022-06-24 PROCEDURE — 3075F SYST BP GE 130 - 139MM HG: CPT | Mod: CPTII,S$GLB,, | Performed by: NURSE PRACTITIONER

## 2022-06-24 PROCEDURE — 1159F MED LIST DOCD IN RCRD: CPT | Mod: CPTII,S$GLB,, | Performed by: INTERNAL MEDICINE

## 2022-06-24 PROCEDURE — 99999 PR PBB SHADOW E&M-EST. PATIENT-LVL IV: CPT | Mod: PBBFAC,,, | Performed by: INTERNAL MEDICINE

## 2022-06-24 PROCEDURE — 99999 PR PBB SHADOW E&M-EST. PATIENT-LVL IV: ICD-10-PCS | Mod: PBBFAC,,, | Performed by: INTERNAL MEDICINE

## 2022-06-24 PROCEDURE — 3008F PR BODY MASS INDEX (BMI) DOCUMENTED: ICD-10-PCS | Mod: CPTII,S$GLB,, | Performed by: INTERNAL MEDICINE

## 2022-06-24 PROCEDURE — 1159F PR MEDICATION LIST DOCUMENTED IN MEDICAL RECORD: ICD-10-PCS | Mod: CPTII,S$GLB,, | Performed by: NURSE PRACTITIONER

## 2022-06-24 PROCEDURE — 3074F PR MOST RECENT SYSTOLIC BLOOD PRESSURE < 130 MM HG: ICD-10-PCS | Mod: CPTII,S$GLB,, | Performed by: INTERNAL MEDICINE

## 2022-06-24 PROCEDURE — 3074F SYST BP LT 130 MM HG: CPT | Mod: CPTII,S$GLB,, | Performed by: INTERNAL MEDICINE

## 2022-06-24 PROCEDURE — 99214 PR OFFICE/OUTPT VISIT, EST, LEVL IV, 30-39 MIN: ICD-10-PCS | Mod: S$GLB,,, | Performed by: NURSE PRACTITIONER

## 2022-06-24 PROCEDURE — 3008F BODY MASS INDEX DOCD: CPT | Mod: CPTII,S$GLB,, | Performed by: NURSE PRACTITIONER

## 2022-06-24 PROCEDURE — 99214 OFFICE O/P EST MOD 30 MIN: CPT | Mod: S$GLB,,, | Performed by: INTERNAL MEDICINE

## 2022-06-24 PROCEDURE — C8929 TTE W OR WO FOL WCON,DOPPLER: HCPCS

## 2022-06-24 PROCEDURE — 3078F PR MOST RECENT DIASTOLIC BLOOD PRESSURE < 80 MM HG: ICD-10-PCS | Mod: CPTII,S$GLB,, | Performed by: NURSE PRACTITIONER

## 2022-06-24 PROCEDURE — 1160F RVW MEDS BY RX/DR IN RCRD: CPT | Mod: CPTII,S$GLB,, | Performed by: INTERNAL MEDICINE

## 2022-06-24 PROCEDURE — 3008F PR BODY MASS INDEX (BMI) DOCUMENTED: ICD-10-PCS | Mod: CPTII,S$GLB,, | Performed by: NURSE PRACTITIONER

## 2022-06-24 PROCEDURE — 93306 ECHO (CUPID ONLY): ICD-10-PCS | Mod: 26,,, | Performed by: INTERNAL MEDICINE

## 2022-06-24 PROCEDURE — 3079F DIAST BP 80-89 MM HG: CPT | Mod: CPTII,S$GLB,, | Performed by: INTERNAL MEDICINE

## 2022-06-24 PROCEDURE — 4010F ACE/ARB THERAPY RXD/TAKEN: CPT | Mod: CPTII,S$GLB,, | Performed by: INTERNAL MEDICINE

## 2022-06-24 PROCEDURE — 3075F PR MOST RECENT SYSTOLIC BLOOD PRESS GE 130-139MM HG: ICD-10-PCS | Mod: CPTII,S$GLB,, | Performed by: NURSE PRACTITIONER

## 2022-06-24 PROCEDURE — 93306 TTE W/DOPPLER COMPLETE: CPT | Mod: 26,,, | Performed by: INTERNAL MEDICINE

## 2022-06-24 PROCEDURE — 3079F PR MOST RECENT DIASTOLIC BLOOD PRESSURE 80-89 MM HG: ICD-10-PCS | Mod: CPTII,S$GLB,, | Performed by: INTERNAL MEDICINE

## 2022-06-24 PROCEDURE — 3008F BODY MASS INDEX DOCD: CPT | Mod: CPTII,S$GLB,, | Performed by: INTERNAL MEDICINE

## 2022-06-24 PROCEDURE — 99214 PR OFFICE/OUTPT VISIT, EST, LEVL IV, 30-39 MIN: ICD-10-PCS | Mod: S$GLB,,, | Performed by: INTERNAL MEDICINE

## 2022-06-24 PROCEDURE — 1159F PR MEDICATION LIST DOCUMENTED IN MEDICAL RECORD: ICD-10-PCS | Mod: CPTII,S$GLB,, | Performed by: INTERNAL MEDICINE

## 2022-06-24 PROCEDURE — 1160F PR REVIEW ALL MEDS BY PRESCRIBER/CLIN PHARMACIST DOCUMENTED: ICD-10-PCS | Mod: CPTII,S$GLB,, | Performed by: NURSE PRACTITIONER

## 2022-06-24 PROCEDURE — 99999 PR PBB SHADOW E&M-EST. PATIENT-LVL V: ICD-10-PCS | Mod: PBBFAC,,, | Performed by: NURSE PRACTITIONER

## 2022-06-24 PROCEDURE — 1159F MED LIST DOCD IN RCRD: CPT | Mod: CPTII,S$GLB,, | Performed by: NURSE PRACTITIONER

## 2022-06-24 RX ORDER — BUPROPION HYDROCHLORIDE 300 MG/1
TABLET ORAL DAILY
COMMUNITY
Start: 2022-06-01

## 2022-06-24 RX ORDER — TAMSULOSIN HYDROCHLORIDE 0.4 MG/1
0.4 CAPSULE ORAL DAILY PRN
Qty: 90 CAPSULE | Refills: 3 | Status: SHIPPED | OUTPATIENT
Start: 2022-06-24 | End: 2023-06-24

## 2022-06-24 RX ORDER — EMPAGLIFLOZIN 10 MG/1
10 TABLET, FILM COATED ORAL DAILY
Qty: 30 TABLET | Refills: 6 | Status: SHIPPED | OUTPATIENT
Start: 2022-06-24 | End: 2022-08-23 | Stop reason: SDUPTHER

## 2022-06-24 RX ORDER — METRONIDAZOLE 500 MG/1
500 TABLET ORAL 2 TIMES DAILY
COMMUNITY
Start: 2022-02-09

## 2022-06-24 RX ORDER — CARVEDILOL 3.12 MG/1
3.12 TABLET ORAL DAILY
Qty: 90 TABLET | Refills: 3 | Status: SHIPPED | OUTPATIENT
Start: 2022-06-24 | End: 2022-06-24 | Stop reason: SDUPTHER

## 2022-06-24 RX ORDER — FUROSEMIDE 40 MG/1
20 TABLET ORAL
Qty: 45 TABLET | Refills: 3 | Status: SHIPPED | OUTPATIENT
Start: 2022-06-24 | End: 2023-06-24

## 2022-06-24 RX ORDER — DIAZEPAM 2 MG/1
2 TABLET ORAL EVERY 6 HOURS PRN
Qty: 90 TABLET | Refills: 1 | Status: CANCELLED | OUTPATIENT
Start: 2022-06-24 | End: 2023-06-24

## 2022-06-24 RX ORDER — CARVEDILOL 6.25 MG/1
6.25 TABLET ORAL 2 TIMES DAILY
Qty: 180 TABLET | Refills: 3 | Status: SHIPPED | OUTPATIENT
Start: 2022-06-24 | End: 2023-06-24

## 2022-06-24 RX ADMIN — HUMAN ALBUMIN MICROSPHERES AND PERFLUTREN 0.66 MG: 10; .22 INJECTION, SOLUTION INTRAVENOUS at 10:06

## 2022-06-24 NOTE — NURSING NOTE
Patient identified by 2 identifiers. Denies previous reactions to blood transfusions, allergies reviewed & procedure explained.  22 g IV placed to Lt Hand, flushed w/ 10cc NS pre & post contrast administration.  3cc Optison administered by sonographer, echo images obtained.  Pt tolerated procedure well.  IV D/C'ed, preasure dsg applied.  Pt D/C'ed to home.

## 2022-06-24 NOTE — PATIENT INSTRUCTIONS
Meal Ideas for Regular Bariatric Diet  *Recipes and products available at [email or web address]      Breakfast: (15-20g protein)    - Egg white omelet: 2 egg whites or ½ cup Egg Beaters. (Optional proteins: cheese, shrimp, black beans, chicken, sliced turkey) (Optional veggies: tomatoes, salsa, spinach, mushrooms, onions, green peppers, or small slice avocado)     - Egg and sausage: 1 egg or ¼ cup Egg Beaters (any variety), with 1 wendie or 2 links of Turkey sausage or Veggie breakfast sausage (FaceAlerta or Oxigene)    - Crust-less breakfast quiche: To make a glass pie dish, mix 4oz part skim Ricotta, 1 cup skim milk, and 2 eggs as your base. Add protein: shredded cheese, sliced lean ham or turkey, turkey grajeda/sausage. Add veggies: tomato, onion, green onion, mushroom, green pepper, spinach, etc.    - Yogurt parfait: Mix 1 - 6oz container Dannon Light N Fit vanilla yogurt, with ¼ cup crushed unsalted nuts    - Cottage cheese and fruit: ½ cup part-skim cottage cheese or ricotta cheese topped with fresh fruit or sugar free preserves     - Ayla Warren's Vanilla Egg custard* (add 2 Tbsp instant coffee granules to make Cappuccino Custard*)    - Hi-Protein café latte (skim milk, decaf coffee, 1 scoop protein powder). Optional to add Sugar free syrup or extract flavoring.    - Breakfast Lox: spread fat free cream cheese on slices of smoked salmon. Serve over scrambled or egg over easy (sauteed with nonstick cookspray) OR on a cucumber slice    - Eggwhich: Scramble or cook 1 large egg over easy using nonstick cookspray. Place between 2 slices of Colombian grajeda and low fat cheese.     Lunch: (20-30g protein)    - ½ cup Black bean soup (Homemade or Progresso), with ¼ cup shredded low-fat cheese. Top with chopped tomato or fresh salsa.     - Lean deli turkey breast and low-fat sliced cheese, mustard or light sandoval to moisten, rolled up together, or wrapped in a Natalio lettuce leaf    - Chicken salad made from dinner  leftovers, moisten with low-fat salad dressing or light sandoval. Also try leftover salmon, shrimp, tuna or boiled eggs. Serve ½ cup over dark green salad    - Fat-free canned refried beans, topped with ¼ cup shredded low-fat cheese. Top with chopped tomato or fresh salsa.     - Greek salad: Top mixed greens with 1-2oz grilled chicken, tomatoes, red onions, 2-3 kalamata olives, and sprinkle lightly with feta cheese. Spritz with Balsamic vinegar to taste.     - Crust-less lunch quiche: To make a glass pie dish, mix 4oz part skim Ricotta, 1 cup skim milk, and 2 eggs as your base. Add protein: shredded cheese, sliced lean ham or turkey, shrimp, chicken. Add veggies: tomato, onion, green onion, mushroom, green pepper, spinach, artichoke, broccoli, etc.    - Pizza bake: spread a  mari timo mushroom with tomato sauce, low-fat shredded mozzarella and turkey pepperoni or [place] grajeda. Add any veggies. Roast for 10-15 minutes, until cheese melted.     - Cucumber crab bites: Spread ¼ cup crab dip (lump crabmeat + light cream cheese and green onions) over sliced cucumber.     - Chicken with light spinach and artichoke dip*: Puree in : 6oz cooked and drained spinach, 2 cloves garlic, 1 can cannelloni beans, ½ cup chopped green onions, 1 can drained artichoke hearts (not marinated in oil), lemon juice and basil. Mix in 2oz chopped up chicken.    Supper: (20-30g protein)    - Serve grilled fish over dark green salad tossed with low-fat dressing, served with grilled asparagus jenkins     - Rotisserie chicken salad: served with sliced strawberries, walnuts, fat-free feta cheese crumbles and 1 tbsp Marss Own Light Raspberry [place]ell Vinaigrette    - Shrimp cocktail: Dip cold boiled shrimp in homemade low-sugar cocktail sauce (1/2 cup Logan One Carb ketchup, 2 tbsp horseradish, 1/4 tsp hot sauce, 1 tsp Worcestershire sauce, 1 tbsp freshly-squeezed lemon juice). Serve with dark green salad, walnuts, and crumbled blue  cheese drizzled with olive oil and Balsamic vinegar    - Tuna Melt: Spread tuna salad onto 2 thick slices of tomato. Top with low-fat cheese and broil until cheese is melted. May also be made with chicken salad of shrimp salad. Bivins with different types of cheeses.    - Chicken or beef fajitas (no tortilla, rice, beans, chips). Top meat and veggies w/ fresh salsa, fat free sour cream.     - Homemade low-fat Chili using extra lean ground beef or ground turkey. Top with shredded cheese and salsa as desired. May add dollop fat-free sour cream if desired    - Chicken parmesan: Top chicken breast w/ low sugar marinara sauce, mozzarella cheese and bake until chicken reaches 165*.  Serve w/ spaghetti SQUASH or [place]itanian cut green beans    - Dinner Omelet with shrimp or chicken and onion, green peppers and chives.    - No noodle lasagna: Use sliced zucchini or eggplant in place of noodles.  Layer with part skim ricotta cheese and low sugar meat sauce (use very lean ground beef or ground turkey).    - Mexican chicken bake: Bake chunks of chicken breast or thigh with taco seasoning, Pace brand enchilada sauce, green onions and low-fat cheese. Serve with ¼ cup black beans or fat free refried beans topped with chopped tomatoes or salsa.    - Jazmin frozen meatballs, simmered in Classico Marinara sauce. Different flavors of salsa or spaghetti sauce create different dishes! Sprinkle with parmesan cheese. Serve with grilled or steamed veggies, or a dark green salad.    - Simmer boneless skinless chicken thigh chunks in Classico Marinara sauce or roasted salsa until tender with chopped onion, bell pepper, garlic, mushrooms, spinach, etc.     - Hamburger or veggie burger, without the bun, dressed the way you like. Served with grilled or steamed veggies.    - Eggplant parmesan: Bake slices of eggplant at 350 degrees for 15 minutes. Layer tomato sauce, sliced eggplant and low-fat mozzarella cheese in a baking dish and cover with  foil. Bake 30-40 more minutes or until bubbly. Uncover and bake at 400 degrees for about 15 more minutes, or until top is slightly crisp.    - Fish tacos: grilled/baked white fish, wrapped in Natalio lettuce leaf, topped with salsa, shredded low-fat cheese, and light coleslaw.    - Chicken andi: Sprinkle chicken w/ 1 tsp of hidden [place] ranch dip mix. Then grill chicken and top with black beans, salsa and 1 tsp fat free sour cream.     - Cauliflower pizza crust: Use cauliflower as crust (see recipe on pinterest, no flour!). Top w/ low fat cheese, turkey pepperoni and veggies and bake again    - chicken or turkey crust pizza: use ground chicken or turkey instead of cauliflower, spread in [place] and bake at 350 for about 20-30 minutes(may want to add garlic, black pepper, oregano and other herbs to ground meat mixture).  Remove and top w/ low fat cheese, turkey pepperoni and veggies and bake again for another 10 minutes or until cheese is browned.     Snacks: (100-200 calories; >5g protein)    - 1 low-fat cheese stick with 8 cherry tomatoes or 1 serving fresh fruit  - 4 thin slices fat-free turkey breast and 1 slice low-fat cheese  - 4 thin slices fat-free honey ham with wedge of melon  - 6-8 edamame pods (equivalent to about 1/4 cup edamame without pods).   - 1/4 cup unsalted nuts with ½ cup fruit  - 6-oz container Dannon Light n Fit vanilla yogurt, topped with 1oz unsalted nuts         - apple, celery or baby carrots spread with 2 Tbsp PB2  - apple slices with 1 oz slice low-fat cheese  - Apple slices dipped in 2 Tbsp of PB2  - celery, cucumber, bell pepper or baby carrots dipped in ¼ cup hummus bean spread or light spinach and artichoke dip (*recipe in lunch section)  - celery, cucumber, baby carrots dipped in high protein greek yogurt (Mix 16 oz plain greek yogurt + 1 packet of hidden valley ranch dip mix)  - Eleno Links Beef Steak - 14g protein! (similar to beef jerky)  - 2 wedges Laughing Cow - Light Herb  & Garlic Cheese with sliced cucumber or green bell pepper  - 1/2 cup low-fat cottage cheese with ¼ cup fruit or ¼ cup salsa  - RTD Protein drinks: Atkins, Low Carb Slim Fast, EAS light, Muscle Milk Light, etc.  - Homemade Protein drinks: GNC Soy95, Isopure, Nectar, UNJURY, Whey Gourmet, etc. Mix 1 scoop powder with 8oz skim/1% milk or light soymilk.  - Protein bars: Atkins, EAS, Pure Protein, Think Thin, Detour, etc. Must have 0-4 grams sugar - Read the label.    Takeout Options: No more than twice/week  Deli - Salads (no pasta or rice), meats, cheeses. Roasted chicken. Lox (salmon)    Mexican - Platters which don't include tortillas, chips, or rice. Go easy on the beans. Example: Fajitas without the tortillas. Ask the  not to bring chips to the table if they are too tempting.    Greek - Meat or fish and vegetable, but no bread or rice. Including hummus, baba ganoush, etc, is OK. Most sit-down Greek restaurants can provide you with cucumber slices for dipping instead of nydia bread.    Fast Food (Avoid as much as possible) - Salads (no croutons and limit salad dressing to 2 tbsp), grilled chicken sandwich without the bun and ask for no sandoval. Flakitas low fat chili or Taco Bell pintos and cheese.    BBQ - The meats are fine if you ask for sauces on the side, but most of the traditional side dishes are loaded with carbs. Kyle slaw, baked beans and BBQ sauce are typically made with sugar.    Chinese - Nothing deep-fried, no rice or noodles. Many Chinese sauces have starch and sugar in them, so you'll have to use your judgement. If you find that these sauces trigger cravings, or cause Dumping, you can ask for the sauce to be made without sugar or just use soy sauce.

## 2022-06-24 NOTE — PROGRESS NOTES
BARIATRIC POST-OPERATIVE FOLLOW UP:    HPI:  41 y.o.-year-old male presents for 2 year follow up.    Denies: nausea, vomiting, abdominal pain, changes in bowel movement pattern, fever, chills, dysphagia, chest pain, and shortness of breath.      ROS:    Review of Systems   Constitutional: Negative for activity change and fatigue.   Respiratory: Negative for shortness of breath.    Cardiovascular: Negative for chest pain, palpitations and leg swelling.   Gastrointestinal: Negative for abdominal pain, diarrhea and vomiting.   Endocrine: Negative for polydipsia, polyphagia and polyuria.   Genitourinary: Negative for dysuria.   Musculoskeletal: Negative for gait problem.   Skin: Negative for rash.   Allergic/Immunologic: Negative for immunocompromised state.   Neurological: Negative for dizziness, syncope and weakness.   Hematological: Does not bruise/bleed easily.   Psychiatric/Behavioral: Negative for behavioral problems.       EXERCISE:  Jitsu,  Bike, walking     VITAMINS:  Tolerating well  Not tolerating iron    MEDICATIONS/ALLERGIES:  Have been reviewed.    DIET:  Bariatric Regular Diet  Protein~ not tracking   Fluids~ not tracking     PHYSICAL EXAM:  GENERAL: 41 y.o.-year-old male in NAD, A&O x3  VITAL SIGNS:  BP:    CHEST: Clear to auscultation, regular effort.  HEART: Regular rate and rhythm. No murmurs, clicks, or gallops.  ABDOMEN: Bowel sounds present in all four quadrants. Soft, non-tender, non-distended. Well-healing surgical scars are clean, dry, and intact without signs of infection or bleeding.  EXTREMITIES: No clubbing, cyanosis, or edema.    ASSESSMENT:  - Morbid obesity s/p sleeve gastrectomy on 9/2019.  - Co-morbidities: obstructive sleep apnea and CHF  - Weight loss 117  Lbs 56 %EWL  - Exercise regimen  - Diet fair    PLAN:  - Emphasized the importance of regular exercise and adherence to bariatric diet to achieve maximum weight loss.  - Encouraged patient to begin OR continue regular exercise.  -  Follow-up with dietician to reinforce diet.  - Continue daily vitamins and medications.  - RTC in 12 months or sooner if needed.  - Call the office for any issues.  - Check labs today.

## 2022-06-24 NOTE — PROGRESS NOTES
Subjective:   Cc: followup   HPI:  Mr. Landaverde is a 41 y.o. year old Unknown male who has presents to be considered for advanced surgical options (LVAD/OHT). Patient has had heart failure since last year. Patient has been referred by Dr. Richards. Patient last seen for CHF about a year ago. Walking 2 miles with the dog, twice a day. Doesn't get short of breath anymore. Patient denies N/V/F/C, lightheadedness, dizziness, PND, orthopnea, LE edema, abdominal pain, abdominal pressure, chest pain, chest pressure, syncope, pre-syncope. NYHA FC II. Doing great since losing weight, 140 lbs down now. Feels great. Was taking valium for palpitations, states Dr. Mariee had prescribed it, holter was negative. No ICD shocks.     Echo January 2019  · Moderate left ventricular enlargement.  · Severely decreased left ventricular systolic function. The estimated ejection fraction is 20%  · Severe global hypokinetic wall motion.  · Grade I (mild) left ventricular diastolic dysfunction consistent with impaired relaxation.  · Moderate left atrial enlargement.  · Low normal right ventricular systolic function.  · Mild right atrial enlargement.  · Mild mitral regurgitation.  · Mild tricuspid regurgitation.  · Normal central venous pressure (3 mm Hg).  · The estimated PA systolic pressure is 32 mm Hg      2D echo with CFD  - Eccentric hypertrophy.   - Severely depressed left ventricular systolic function (EF 15-20%).   - Moderate left atrial enlargement.   - Biatrial enlargement.   - Normal right ventricular systolic function .   - The estimated PA systolic pressure is greater than 23 mmHg.   - Mild tricuspid regurgitation.   - Atrial septal aneurysm .      [facility]  AOPRES: 132/90 (104)  AOSAT: 97  FICKCI: 2.25  FICKCO: 5.62  PAPRES: 45/24 (31)  PASAT: 69  PVR: 2.31  PWPRES: 25/18 (18)  RAPRES: 10/8 (7)  RVPRES: 44/7, 7  SVR: 17.26  PWSAT: 95     CPX   - The respiratory exchange ratio (RER) was 1.13, suggesting an adequate effort.  - The  breathing reserve is calculated at 33%, which is normal. Oxygen saturation with exercise remained normal.   - The PkVO2 was 19.2 ml/kg/min which is 47% of predicted equating to a functional capacity of 5.5 METS indicating severe functional impairment.   - The anaerobic threshold (AT), which occurred at a heart rate of 125bpm, was 12.8 ml/kg/min, which is 31% of the predicted VO2 and is reduced.   - The PkVO2 Lean was 27.24 ml/kg of lean body weight/min indicating a good prognosis in heart failure.   - The VE/VCO2 decreased by -31% from rest to AT. The VE/VCO2 [place] was 22.1.  The Resting PetCO2 was 43.7.     Past Medical History:   Diagnosis Date    Allergy     Cardiomyopathy     CHF (congestive heart failure)     Depression     Hypertension     Obesity      Past Surgical History:   Procedure Laterality Date    defibrillator      ESOPHAGOGASTRODUODENOSCOPY N/A 9/26/2019    Procedure: EGD (ESOPHAGOGASTRODUODENOSCOPY);  Surgeon: Erik Blas Jr., MD;  Location: [address];  Service: General;  Laterality: N/A;    FRACTURE SURGERY Left     ankle    LAPAROSCOPIC SLEEVE GASTRECTOMY N/A 9/26/2019    Procedure: GASTRECTOMY, SLEEVE, LAPAROSCOPIC, with intraop EGD 28192;  Surgeon: Erik Blas Jr., MD;  Location: [address];  Service: General;  Laterality: N/A;    VASECTOMY         Family History   Problem Relation Age of Onset    Hypertension Mother     No Known Problems Father     Hypertension Brother     No Known Problems Daughter     No Known Problems Son        Review of Systems   Constitutional: Negative for chills, decreased appetite, diaphoresis, fever, malaise/fatigue, weight gain and weight loss.   HENT: Negative for congestion.    Eyes: Negative for blurred vision and visual disturbance.   Cardiovascular: Negative for chest pain, dyspnea on exertion, irregular heartbeat, leg swelling, near-syncope, orthopnea, palpitations, paroxysmal nocturnal dyspnea and syncope.  "  Respiratory: Negative for cough, shortness of breath, sleep disturbances due to breathing, snoring and wheezing.    Hematologic/Lymphatic: Negative for bleeding problem. Does not bruise/bleed easily.   Skin: Negative for poor wound healing and rash.   Musculoskeletal: Negative for arthritis, joint pain and muscle weakness.   Gastrointestinal: Negative for bloating, abdominal pain, constipation, diarrhea, hematemesis, hematochezia, melena, nausea and vomiting.   Genitourinary: Negative for frequency and urgency.   Neurological: Positive for excessive daytime sleepiness. Negative for difficulty with concentration, dizziness, headaches, light-headedness and weakness.   Psychiatric/Behavioral: Negative for depression. The patient does not have insomnia and is not nervous/anxious.        Objective:   Blood pressure 112/84, pulse 87, height 5' 9" (1.753 m), weight 114.8 kg (253 lb 1.4 oz).body mass index is 37.37 kg/m².    Physical Exam  Vitals and nursing note reviewed.   Constitutional:       General: He is not in acute distress.     Appearance: He is well-developed. He is not diaphoretic.   HENT:      Head: Normocephalic and atraumatic.      Nose: Nose normal.      Mouth/Throat:      Pharynx: No oropharyngeal exudate.   Eyes:      General: No scleral icterus.     Conjunctiva/sclera: Conjunctivae normal.      Pupils: Pupils are equal, round, and reactive to light.   Neck:      Thyroid: No thyromegaly.      Vascular: JVD present.      Trachea: No tracheal deviation.   Cardiovascular:      Rate and Rhythm: Normal rate and regular rhythm.      Heart sounds: Normal heart sounds, S1 normal and S2 normal. No murmur heard.    No friction rub. No gallop.   Pulmonary:      Effort: Pulmonary effort is normal. No respiratory distress.      Breath sounds: Normal breath sounds. No wheezing or rales.   Chest:      Chest wall: No tenderness.   Abdominal:      General: Bowel sounds are normal. There is no distension.      Palpations: " Abdomen is soft. There is no mass.      Tenderness: There is no abdominal tenderness. There is no guarding or rebound.   Musculoskeletal:         General: No tenderness. Normal range of motion.      Cervical back: Normal range of motion and neck supple.   Skin:     General: Skin is warm and dry.      Coloration: Skin is not pale.      Findings: No erythema or rash.   Neurological:      Mental Status: He is alert and oriented to person, place, and time.      Coordination: Coordination normal.   Psychiatric:         Behavior: Behavior normal.         Thought Content: Thought content normal.         Judgment: Judgment normal.         Labs:      Chemistry        Component Value Date/Time     06/24/2022 0720    K 4.4 06/24/2022 0720     06/24/2022 0720    CO2 25 06/24/2022 0720    BUN 10 06/24/2022 0720    CREATININE 0.8 06/24/2022 0720     (H) 06/24/2022 0720        Component Value Date/Time    CALCIUM 9.2 06/24/2022 0720    ALKPHOS 74 06/24/2022 0720    AST 15 06/24/2022 0720    ALT 15 06/24/2022 0720    BILITOT 0.4 06/24/2022 0720          Magnesium   Date Value Ref Range Status   06/24/2022 2.0 1.6 - 2.6 mg/dL Final     Lab Results   Component Value Date    WBC 8.87 02/08/2021    HGB 15.5 02/08/2021    HCT 48.5 02/08/2021    MCV 90 02/08/2021     02/08/2021     BNP   Date Value Ref Range Status   06/24/2022 68 0 - 99 pg/mL Final     Comment:     Values of less than 100 pg/ml are consistent with non-CHF populations.   02/08/2021 59 0 - 99 pg/mL Final     Comment:     Values of less than 100 pg/ml are consistent with non-CHF populations.   04/11/2019 71 0 - 99 pg/mL Final     Comment:     Values of less than 100 pg/ml are consistent with non-CHF populations.     Labs were reviewed with the patient.    Assessment:      1. Chronic combined systolic and diastolic congestive heart failure, NYHA class 3    2. Anxiety    3. Nonischemic dilated cardiomyopathy    4. ORIN (obstructive sleep apnea)    5.  Class 2 severe obesity due to excess calories with serious comorbidity and body mass index (BMI) of 37.0 to 37.9 in adult    6. Implantable cardioverter-defibrillator (ICD) in situ    7. Palpitations        Plan:   Patient with HFrEF NYHA FC II.   Recommend 2 gram sodium restriction and 1500cc fluid restriction.  Encourage physical activity with graded exercise program.  Requested patient to weigh themselves daily, and to notify us if their weight increases by more than 3 lbs in 1 day or 5 lbs in 1 week.  Echo to followup on LVEF since uptitrating meds further.   Palpitations were being treated for by valium, but don't see any ectopic beats on EKG.   Recommend working on weight loss further, BMI still 37.37, for OHT needs to be below 35, closer to 30 if possible.  Patient very interested in doing the right things, will follow up with his PCP and cardiologist re: palpitations. Would benefit from wearing event monitor (states not everyday). States he wore one for a week for Dr. Richards. Will follow up on results.     Shawna Yates MD

## 2022-06-24 NOTE — LETTER
June 24, 2022        Germán Richards  [id number]  [place] [address]  Phone: [phone]  Fax: [phone]             [facility]  [address] [facility] [address] [address]  Phone: [phone]   Patient: Trell Landaverde   MR Number: [id number]   YOB: 1980   Date of Visit: 6/24/2022       Dear Dr. Germán Richards    Thank you for referring Trell Landaverde to me for evaluation. Attached you will find relevant portions of my assessment and plan of care.    If you have questions, please do not hesitate to call me. I look forward to following Trell Landaverde along with you.    Sincerely,    Shawna Yates MD    Enclosure    If you would like to receive this communication electronically, please contact [email or web address] or [phone] to request [employer] access.    [employer] is a tool which provides read-only access to select patient information with whom you have a relationship. Its easy to use and provides real time access to review your patients record including encounter summaries, notes, results, and demographic information.    If you feel you have received this communication in error or would no longer like to receive these types of communications, please e-mail [email or web address]

## 2022-06-30 ENCOUNTER — LAB VISIT (OUTPATIENT)
Dept: PRIMARY CARE CLINIC | Facility: CLINIC | Age: 42
End: 2022-06-30
Payer: MEDICARE

## 2022-06-30 DIAGNOSIS — E66.9 OBESITY: ICD-10-CM

## 2022-06-30 DIAGNOSIS — I50.40 COMBINED SYSTOLIC AND DIASTOLIC CONGESTIVE HEART FAILURE, NYHA CLASS 3, UNSPECIFIED CONGESTIVE HEART FAILURE CHRONICITY: ICD-10-CM

## 2022-06-30 DIAGNOSIS — D53.9 NUTRITIONAL ANEMIA, UNSPECIFIED: ICD-10-CM

## 2022-06-30 DIAGNOSIS — Z98.84 S/P LAPAROSCOPIC SLEEVE GASTRECTOMY: ICD-10-CM

## 2022-06-30 DIAGNOSIS — I10 BENIGN ESSENTIAL HYPERTENSION: ICD-10-CM

## 2022-06-30 DIAGNOSIS — I50.20 SYSTOLIC CONGESTIVE HEART FAILURE, UNSPECIFIED HF CHRONICITY: ICD-10-CM

## 2022-06-30 LAB
25(OH)D3+25(OH)D2 SERPL-MCNC: 33 NG/ML (ref 30–96)
CHOLEST SERPL-MCNC: 160 MG/DL (ref 120–199)
CHOLEST/HDLC SERPL: 3.4 {RATIO} (ref 2–5)
HDLC SERPL-MCNC: 47 MG/DL (ref 40–75)
HDLC SERPL: 29.4 % (ref 20–50)
IRON SERPL-MCNC: 111 UG/DL (ref 45–160)
LDLC SERPL CALC-MCNC: 83.8 MG/DL (ref 63–159)
NONHDLC SERPL-MCNC: 113 MG/DL
SATURATED IRON: 33 % (ref 20–50)
TOTAL IRON BINDING CAPACITY: 332 UG/DL (ref 250–450)
TRANSFERRIN SERPL-MCNC: 224 MG/DL (ref 200–375)
TRIGL SERPL-MCNC: 146 MG/DL (ref 30–150)
VIT B12 SERPL-MCNC: 879 PG/ML (ref 210–950)

## 2022-06-30 PROCEDURE — 80061 LIPID PANEL: CPT | Performed by: NURSE PRACTITIONER

## 2022-06-30 PROCEDURE — 84466 ASSAY OF TRANSFERRIN: CPT | Performed by: NURSE PRACTITIONER

## 2022-06-30 PROCEDURE — 84425 ASSAY OF VITAMIN B-1: CPT | Performed by: NURSE PRACTITIONER

## 2022-06-30 PROCEDURE — 82607 VITAMIN B-12: CPT | Performed by: NURSE PRACTITIONER

## 2022-06-30 PROCEDURE — 82306 VITAMIN D 25 HYDROXY: CPT | Performed by: NURSE PRACTITIONER

## 2022-06-30 NOTE — PROGRESS NOTES
Venipuncture performed with 23 gauge butterfly, x's 1 attempt,  to right hand .  Specimens collected per orders.      Pressure dressing applied to site, instructed patient to remove dressing in 10-15 minutes, OK to re-adjust dressing if pressure causing any discomfort, to observe closely for numbness and/or discoloration to hand or fingers, and to notify provider if bleeding persists after applying constant pressure lasting 30 minutes.

## 2022-07-01 ENCOUNTER — TELEPHONE (OUTPATIENT)
Dept: TRANSPLANT | Facility: CLINIC | Age: 42
End: 2022-07-01
Payer: MEDICARE

## 2022-07-01 NOTE — TELEPHONE ENCOUNTER
4:10 pm: following up on yesterdays nurse lab phone reminder   Pt saw Dr Yates 6/24/22 and per her AVS pt was starting on Jardiance   Reminder entered that date for todays reminder  said 0700 labs @ [facility] [place]    All labs scheduled for today was by a NP not in [facility] and no chemistries scheduled, which is typically what would be ordered by our providers in regard to Jardiance  Just sent message to [facility] [place] [facility] asking if she would see if can add a BMP to labs, though doubt can since were drawn yesterday and if not, to please reschedule pt for lab appt Tuesday, July 5th and let pt know and know why    KURT Mcclendon informed me 4:27 pm she spoke with pt to schedule for 7/5/22 and he is insisting on going back today.   She is working on lab orders for today since pt insisting.

## 2022-07-05 ENCOUNTER — PATIENT MESSAGE (OUTPATIENT)
Dept: BARIATRICS | Facility: CLINIC | Age: 42
End: 2022-07-05
Payer: MEDICARE

## 2022-07-05 LAB — VIT B1 BLD-MCNC: 77 UG/L (ref 38–122)

## 2022-07-08 ENCOUNTER — TELEPHONE (OUTPATIENT)
Dept: TRANSPLANT | Facility: CLINIC | Age: 42
End: 2022-07-08
Payer: MEDICARE

## 2022-08-02 ENCOUNTER — PATIENT MESSAGE (OUTPATIENT)
Dept: BARIATRICS | Facility: CLINIC | Age: 42
End: 2022-08-02
Payer: MEDICARE

## 2022-08-04 ENCOUNTER — PATIENT MESSAGE (OUTPATIENT)
Dept: BARIATRICS | Facility: CLINIC | Age: 42
End: 2022-08-04
Payer: MEDICARE

## 2022-08-23 RX ORDER — EMPAGLIFLOZIN 10 MG/1
10 TABLET, FILM COATED ORAL DAILY
Qty: 90 TABLET | Refills: 0 | Status: SHIPPED | OUTPATIENT
Start: 2022-08-23

## 2022-09-07 ENCOUNTER — PATIENT MESSAGE (OUTPATIENT)
Dept: BARIATRICS | Facility: CLINIC | Age: 42
End: 2022-09-07
Payer: MEDICARE

## 2022-10-06 ENCOUNTER — PATIENT MESSAGE (OUTPATIENT)
Dept: BARIATRICS | Facility: CLINIC | Age: 42
End: 2022-10-06
Payer: MEDICARE

## 2022-11-04 ENCOUNTER — PATIENT MESSAGE (OUTPATIENT)
Dept: BARIATRICS | Facility: CLINIC | Age: 42
End: 2022-11-04
Payer: MEDICARE

## 2022-12-07 ENCOUNTER — PATIENT MESSAGE (OUTPATIENT)
Dept: BARIATRICS | Facility: CLINIC | Age: 42
End: 2022-12-07
Payer: MEDICARE

## 2023-01-09 ENCOUNTER — PATIENT MESSAGE (OUTPATIENT)
Dept: BARIATRICS | Facility: CLINIC | Age: 43
End: 2023-01-09
Payer: MEDICARE

## 2023-02-09 ENCOUNTER — PATIENT MESSAGE (OUTPATIENT)
Dept: BARIATRICS | Facility: CLINIC | Age: 43
End: 2023-02-09
Payer: MEDICARE

## 2023-02-14 ENCOUNTER — PATIENT MESSAGE (OUTPATIENT)
Dept: BARIATRICS | Facility: CLINIC | Age: 43
End: 2023-02-14
Payer: MEDICARE

## 2023-02-22 ENCOUNTER — PATIENT MESSAGE (OUTPATIENT)
Dept: BARIATRICS | Facility: CLINIC | Age: 43
End: 2023-02-22
Payer: MEDICARE

## 2023-03-08 ENCOUNTER — PATIENT MESSAGE (OUTPATIENT)
Dept: BARIATRICS | Facility: CLINIC | Age: 43
End: 2023-03-08
Payer: MEDICARE

## 2023-03-14 ENCOUNTER — PATIENT MESSAGE (OUTPATIENT)
Dept: BARIATRICS | Facility: CLINIC | Age: 43
End: 2023-03-14
Payer: MEDICARE

## 2023-04-05 ENCOUNTER — PATIENT MESSAGE (OUTPATIENT)
Dept: BARIATRICS | Facility: CLINIC | Age: 43
End: 2023-04-05
Payer: MEDICARE

## 2023-04-10 ENCOUNTER — TELEPHONE (OUTPATIENT)
Dept: BARIATRICS | Facility: CLINIC | Age: 43
End: 2023-04-10
Payer: MEDICARE

## 2023-04-10 ENCOUNTER — PATIENT MESSAGE (OUTPATIENT)
Dept: BARIATRICS | Facility: CLINIC | Age: 43
End: 2023-04-10
Payer: MEDICARE

## 2023-04-10 NOTE — TELEPHONE ENCOUNTER
Attempted to reach patient in regards to scheduling 3 year post op. No answer. Sent portal message.

## 2023-04-11 ENCOUNTER — PATIENT MESSAGE (OUTPATIENT)
Dept: BARIATRICS | Facility: CLINIC | Age: 43
End: 2023-04-11
Payer: MEDICARE

## 2023-05-03 ENCOUNTER — PATIENT MESSAGE (OUTPATIENT)
Dept: BARIATRICS | Facility: CLINIC | Age: 43
End: 2023-05-03
Payer: MEDICARE

## 2023-05-09 ENCOUNTER — PATIENT MESSAGE (OUTPATIENT)
Dept: BARIATRICS | Facility: CLINIC | Age: 43
End: 2023-05-09
Payer: MEDICARE

## 2023-06-07 ENCOUNTER — PATIENT MESSAGE (OUTPATIENT)
Dept: BARIATRICS | Facility: CLINIC | Age: 43
End: 2023-06-07
Payer: MEDICARE

## 2023-06-13 ENCOUNTER — PATIENT MESSAGE (OUTPATIENT)
Dept: BARIATRICS | Facility: CLINIC | Age: 43
End: 2023-06-13
Payer: MEDICARE

## 2023-08-02 ENCOUNTER — PATIENT MESSAGE (OUTPATIENT)
Dept: BARIATRICS | Facility: CLINIC | Age: 43
End: 2023-08-02
Payer: MEDICARE

## 2023-09-06 ENCOUNTER — PATIENT MESSAGE (OUTPATIENT)
Dept: BARIATRICS | Facility: CLINIC | Age: 43
End: 2023-09-06
Payer: MEDICARE

## 2023-09-12 ENCOUNTER — PATIENT MESSAGE (OUTPATIENT)
Dept: BARIATRICS | Facility: CLINIC | Age: 43
End: 2023-09-12
Payer: MEDICARE

## 2023-09-23 NOTE — PROGRESS NOTES
Subjective:   Initial evaluation of heart transplant candidacy.    HPI:  Mr. Landaverde is a 38 y.o. year old Unknown male who has presents to be considered for advanced surgical options (LVAD/OHT). Patient has had heart failure since last year. Patient has been referred by Dr. Richards. Has been treated with GDMT, had repeat echo 03/16 which still demonstrated reduced LVEF, and therefore 04/30/16 underwent ICD placement. He is here after being [facility] in [place] for insurance issues. Have not seen him since 2016    FC II NYHA six minue walk 1200 feet no dyspnea    Echo January 2019    · Moderate left ventricular enlargement.  · Severely decreased left ventricular systolic function. The estimated ejection fraction is 20%  · Severe global hypokinetic wall motion.  · Grade I (mild) left ventricular diastolic dysfunction consistent with impaired relaxation.  · Moderate left atrial enlargement.  · Low normal right ventricular systolic function.  · Mild right atrial enlargement.  · Mild mitral regurgitation.  · Mild tricuspid regurgitation.  · Normal central venous pressure (3 mm Hg).  · The estimated PA systolic pressure is 32 mm Hg      2D echo with CFD  - Eccentric hypertrophy.   - Severely depressed left ventricular systolic function (EF 15-20%).   - Moderate left atrial enlargement.   - Biatrial enlargement.   - Normal right ventricular systolic function .   - The estimated PA systolic pressure is greater than 23 mmHg.   - Mild tricuspid regurgitation.   - Atrial septal aneurysm .      [facility]  AOPRES: 132/90 (104)  AOSAT: 97  FICKCI: 2.25  FICKCO: 5.62  PAPRES: 45/24 (31)  PASAT: 69  PVR: 2.31  PWPRES: 25/18 (18)  RAPRES: 10/8 (7)  RVPRES: 44/7, 7  SVR: 17.26  PWSAT: 95     CPX   - The respiratory exchange ratio (RER) was 1.13, suggesting an adequate effort.  - The breathing reserve is calculated at 33%, which is normal. Oxygen saturation with exercise remained normal.   - The PkVO2 was 19.2 ml/kg/min which is 47% of predicted  equating to a functional capacity of 5.5 METS indicating severe functional impairment.   - The anaerobic threshold (AT), which occurred at a heart rate of 125bpm, was 12.8 ml/kg/min, which is 31% of the predicted VO2 and is reduced.   - The PkVO2 Lean was 27.24 ml/kg of lean body weight/min indicating a good prognosis in heart failure.   - The VE/VCO2 decreased by -31% from rest to AT. The VE/VCO2 [place] was 22.1.  The Resting PetCO2 was 43.7.     Past Medical History:   Diagnosis Date    Cardiomyopathy     CHF (congestive heart failure)     Hypertension     Obesity      Past Surgical History:   Procedure Laterality Date    defibrillator         No family history on file.    Review of Systems   Constitution: Negative for chills, decreased appetite, diaphoresis, fever, weakness, malaise/fatigue, weight gain and weight loss.   HENT: Negative for congestion.    Eyes: Negative for blurred vision and visual disturbance.   Cardiovascular: Positive for dyspnea on exertion. Negative for chest pain, irregular heartbeat, leg swelling, near-syncope, orthopnea, palpitations, paroxysmal nocturnal dyspnea and syncope.   Respiratory: Positive for shortness of breath. Negative for cough, sleep disturbances due to breathing, snoring and wheezing.    Hematologic/Lymphatic: Negative for bleeding problem. Does not bruise/bleed easily.   Skin: Negative for poor wound healing and rash.   Musculoskeletal: Negative for arthritis, joint pain and muscle weakness.   Gastrointestinal: Negative for bloating, abdominal pain, constipation, diarrhea, hematemesis, hematochezia, melena, nausea and vomiting.   Genitourinary: Negative for frequency and urgency.   Neurological: Positive for excessive daytime sleepiness. Negative for difficulty with concentration, dizziness, headaches and light-headedness.   Psychiatric/Behavioral: Negative for depression. The patient does not have insomnia and is not nervous/anxious.        Objective:   There were  no vitals taken for this visit.body mass index is unknown because there is no height or weight on file.    Physical Exam   Constitutional: He is oriented to person, place, and time. He appears well-developed and well-nourished. No distress.   HENT:   Head: Normocephalic and atraumatic.   Nose: Nose normal.   Mouth/Throat: Oropharynx is clear and moist. No oropharyngeal exudate.   Eyes: Conjunctivae and EOM are normal. Pupils are equal, round, and reactive to light. No scleral icterus.   Neck: Normal range of motion. Neck supple. No JVD present. No tracheal deviation present. No thyromegaly present.   Cardiovascular: Normal rate, regular rhythm, S1 normal, S2 normal and normal heart sounds. Exam reveals no gallop and no friction rub.   No murmur heard.  Pulmonary/Chest: Effort normal and breath sounds normal. No respiratory distress. He has no wheezes. He has no rales. He exhibits no tenderness.   Abdominal: Soft. Bowel sounds are normal. He exhibits no distension and no mass. There is no tenderness. There is no rebound and no guarding.   Obese abdomen   Musculoskeletal: Normal range of motion. He exhibits edema (trace pitting edema ). He exhibits no tenderness.   Neurological: He is alert and oriented to person, place, and time. Coordination normal.   Skin: Skin is warm and dry. No rash noted. He is not diaphoretic. No erythema. No pallor.   Psychiatric: He has a normal mood and affect. His behavior is normal. Judgment and thought content normal.   Nursing note and vitals reviewed.      Labs:      Chemistry        Component Value Date/Time     01/11/2019 0920    K 4.4 01/11/2019 0920     01/11/2019 0920    CO2 24 01/11/2019 0920    BUN 12 01/11/2019 0920    CREATININE 0.8 01/11/2019 0920     (H) 01/11/2019 0920        Component Value Date/Time    CALCIUM 8.7 01/11/2019 0920    ALKPHOS 80 01/11/2019 0920    AST 17 01/11/2019 0920    ALT 25 01/11/2019 0920    BILITOT 0.3 01/11/2019 0920           Magnesium   Date Value Ref Range Status   07/14/2016 2.1 1.6 - 2.6 mg/dL Final     Lab Results   Component Value Date    WBC 7.90 01/11/2019    HGB 13.0 (L) 01/11/2019    HCT 41.1 01/11/2019    MCV 90 01/11/2019     01/11/2019     BNP   Date Value Ref Range Status   01/11/2019 46 0 - 99 pg/mL Final     Comment:     Values of less than 100 pg/ml are consistent with non-CHF populations.   07/14/2016 154 (H) 0 - 99 pg/mL Final     Comment:     Values of less than 100 pg/ml are consistent with non-CHF populations.   06/13/2016 254 (H) 0 - 99 pg/mL Final     Comment:     Values of less than 100 pg/ml are consistent with non-CHF populations.     Labs were reviewed with the patient.    Assessment:      1. Combined systolic and diastolic congestive heart failure, NYHA class 3, unspecified congestive heart failure chronicity    2. Dilated cardiomyopathy    3. Essential hypertension    4. S/P ICD (internal cardiac defibrillator) procedure    5. Non morbid obesity        Plan:   BMI 55 I have explained to him that this is a contraindication for heart transplant or LVAD we will continue GMT and in 3 months obtain CPX. I will send this note to Brandy JUAREZ  Patient is now NYHA III ACC stage D  Recommend 2 gram sodium restriction and 1500cc fluid restriction.  Encourage physical activity with graded exercise program.  Requested patient to weigh themselves daily, and to notify us if their weight increases by more than 3 lbs in 1 day or 5 lbs in 1 week.     Transplant Candidacy: Patient is a 38 y.o. year old male with heart failure is being seen for possible LVAD. In my opinion, he is  a marginal LVAD candidate. Patient did not meet with [facility] and/or pre-transplant coordinator at the end of this visit for workup. he is scheduled for a CPX in 3 months  UNOS Patient Status  Functional Status: 80% - Normal activity with effort: some symptoms of disease  Physical Capacity: No Limitations  Working for Income: yes  If yes, working  activity level: Working, Part Time vs. Full Time Unknown    Israel Mariee MD        ambulatory

## 2023-10-04 ENCOUNTER — PATIENT MESSAGE (OUTPATIENT)
Dept: BARIATRICS | Facility: CLINIC | Age: 43
End: 2023-10-04
Payer: MEDICARE

## 2023-10-10 ENCOUNTER — PATIENT MESSAGE (OUTPATIENT)
Dept: BARIATRICS | Facility: CLINIC | Age: 43
End: 2023-10-10
Payer: MEDICARE

## 2023-11-13 ENCOUNTER — PATIENT MESSAGE (OUTPATIENT)
Dept: BARIATRICS | Facility: CLINIC | Age: 43
End: 2023-11-13
Payer: MEDICARE

## 2023-11-13 ENCOUNTER — PATIENT MESSAGE (OUTPATIENT)
Dept: SURGERY | Facility: CLINIC | Age: 43
End: 2023-11-13
Payer: MEDICARE

## 2023-11-14 ENCOUNTER — PATIENT MESSAGE (OUTPATIENT)
Dept: BARIATRICS | Facility: CLINIC | Age: 43
End: 2023-11-14
Payer: MEDICARE

## 2023-12-12 ENCOUNTER — PATIENT MESSAGE (OUTPATIENT)
Dept: BARIATRICS | Facility: CLINIC | Age: 43
End: 2023-12-12
Payer: MEDICARE

## 2023-12-13 ENCOUNTER — PATIENT MESSAGE (OUTPATIENT)
Dept: BARIATRICS | Facility: CLINIC | Age: 43
End: 2023-12-13
Payer: MEDICARE

## 2024-01-09 ENCOUNTER — PATIENT MESSAGE (OUTPATIENT)
Dept: BARIATRICS | Facility: CLINIC | Age: 44
End: 2024-01-09
Payer: MEDICARE

## 2024-02-14 ENCOUNTER — PATIENT MESSAGE (OUTPATIENT)
Dept: BARIATRICS | Facility: CLINIC | Age: 44
End: 2024-02-14
Payer: MEDICARE

## 2024-02-20 ENCOUNTER — PATIENT MESSAGE (OUTPATIENT)
Dept: BARIATRICS | Facility: CLINIC | Age: 44
End: 2024-02-20
Payer: MEDICARE

## 2024-02-29 ENCOUNTER — PATIENT MESSAGE (OUTPATIENT)
Dept: BARIATRICS | Facility: CLINIC | Age: 44
End: 2024-02-29
Payer: MEDICARE

## 2024-03-06 ENCOUNTER — PATIENT MESSAGE (OUTPATIENT)
Dept: BARIATRICS | Facility: CLINIC | Age: 44
End: 2024-03-06
Payer: MEDICARE

## 2024-03-07 ENCOUNTER — TELEPHONE (OUTPATIENT)
Dept: BARIATRICS | Facility: CLINIC | Age: 44
End: 2024-03-07
Payer: MEDICARE

## 2024-03-14 ENCOUNTER — OFFICE VISIT (OUTPATIENT)
Dept: BARIATRICS | Facility: CLINIC | Age: 44
End: 2024-03-14
Payer: MEDICARE

## 2024-03-14 VITALS — HEIGHT: 69 IN | BODY MASS INDEX: 38.06 KG/M2 | WEIGHT: 257 LBS

## 2024-03-14 DIAGNOSIS — D53.9 NUTRITIONAL ANEMIA, UNSPECIFIED: ICD-10-CM

## 2024-03-14 DIAGNOSIS — R79.9 ABNORMAL FINDING OF BLOOD CHEMISTRY, UNSPECIFIED: ICD-10-CM

## 2024-03-14 DIAGNOSIS — Z98.84 S/P LAPAROSCOPIC SLEEVE GASTRECTOMY: Primary | ICD-10-CM

## 2024-03-14 PROCEDURE — 1160F RVW MEDS BY RX/DR IN RCRD: CPT | Mod: CPTII,95,, | Performed by: PHYSICIAN ASSISTANT

## 2024-03-14 PROCEDURE — 3008F BODY MASS INDEX DOCD: CPT | Mod: CPTII,95,, | Performed by: PHYSICIAN ASSISTANT

## 2024-03-14 PROCEDURE — 99213 OFFICE O/P EST LOW 20 MIN: CPT | Mod: 95,,, | Performed by: PHYSICIAN ASSISTANT

## 2024-03-14 PROCEDURE — 1159F MED LIST DOCD IN RCRD: CPT | Mod: CPTII,95,, | Performed by: PHYSICIAN ASSISTANT

## 2024-03-14 NOTE — PROGRESS NOTES
BARIATRIC POST-OPERATIVE FOLLOW UP:  The patient location is: home  The chief complaint leading to consultation is: f/u    Visit type: audiovisual    Face to Face time with patient: 30  30 minutes of total time spent on the encounter, which includes face to face time and non-face to face time preparing to see the patient (eg, review of tests), Obtaining and/or reviewing separately obtained history, Documenting clinical information in the electronic or other health record, Independently interpreting results (not separately reported) and communicating results to the patient/family/caregiver, or Care coordination (not separately reported).         Each patient to whom he or she provides medical services by telemedicine is:  (1) informed of the relationship between the physician and patient and the respective role of any other health care provider with respect to management of the patient; and (2) notified that he or she may decline to receive medical services by telemedicine and may withdraw from such care at any time.    Notes:     HPI:  43 y.o.-year-old male presents for 3 year follow up.    Denies: nausea, vomiting, abdominal pain, changes in bowel movement pattern, fever, chills, dysphagia, chest pain, and shortness of breath.      ROS:    Review of Systems   Constitutional:  Negative for activity change and fatigue.   Respiratory:  Negative for shortness of breath.    Cardiovascular:  Negative for chest pain, palpitations and leg swelling.   Gastrointestinal:  Negative for abdominal pain, diarrhea and vomiting.   Endocrine: Negative for polydipsia, polyphagia and polyuria.   Genitourinary:  Negative for dysuria.   Musculoskeletal:  Negative for gait problem.   Skin:  Negative for rash.   Allergic/Immunologic: Negative for immunocompromised state.   Neurological:  Negative for dizziness, syncope and weakness.   Hematological:  Does not bruise/bleed easily.   Psychiatric/Behavioral:  Negative for behavioral  problems.        EXERCISE:  Jitsu,  Bike, walking     VITAMINS:  Tolerating well  Not tolerating iron    MEDICATIONS/ALLERGIES:  Have been reviewed.    DIET:  Bariatric Regular Diet  Protein~ not tracking   Fluids~ not tracking     PHYSICAL EXAM:  GENERAL: 43 y.o.-year-old male in NAD, A&O x3  VITAL SIGNS:  BP:    CHEST: Clear to auscultation, regular effort.  HEART: Regular rate and rhythm. No murmurs, clicks, or gallops.  ABDOMEN: Bowel sounds present in all four quadrants. Soft, non-tender, non-distended. Well-healing surgical scars are clean, dry, and intact without signs of infection or bleeding.  EXTREMITIES: No clubbing, cyanosis, or edema.    ASSESSMENT:  - Morbid obesity s/p sleeve gastrectomy on 9/2019.  - Co-morbidities: obstructive sleep apnea and CHF  - Weight loss 112  Lbs 54 %EWL  - Exercise regimen  - Diet fair    PLAN:  - Emphasized the importance of regular exercise and adherence to bariatric diet to achieve maximum weight loss.  - Encouraged patient to begin OR continue regular exercise.  - Follow-up with dietician to reinforce diet.  - Continue daily vitamins and medications.  - RTC in 12 months or sooner if needed.  - Call the office for any issues.  - Check labs today.

## 2024-04-03 ENCOUNTER — PATIENT MESSAGE (OUTPATIENT)
Dept: BARIATRICS | Facility: CLINIC | Age: 44
End: 2024-04-03
Payer: MEDICARE

## 2024-04-09 ENCOUNTER — PATIENT MESSAGE (OUTPATIENT)
Dept: BARIATRICS | Facility: CLINIC | Age: 44
End: 2024-04-09
Payer: MEDICARE

## 2024-05-14 ENCOUNTER — PATIENT MESSAGE (OUTPATIENT)
Dept: BARIATRICS | Facility: CLINIC | Age: 44
End: 2024-05-14
Payer: MEDICARE

## 2024-06-11 ENCOUNTER — PATIENT MESSAGE (OUTPATIENT)
Dept: BARIATRICS | Facility: CLINIC | Age: 44
End: 2024-06-11
Payer: MEDICARE

## 2024-07-03 ENCOUNTER — PATIENT MESSAGE (OUTPATIENT)
Dept: BARIATRICS | Facility: CLINIC | Age: 44
End: 2024-07-03
Payer: MEDICARE

## 2024-07-09 ENCOUNTER — PATIENT MESSAGE (OUTPATIENT)
Dept: BARIATRICS | Facility: CLINIC | Age: 44
End: 2024-07-09
Payer: MEDICARE

## 2024-08-12 ENCOUNTER — PATIENT MESSAGE (OUTPATIENT)
Dept: BARIATRICS | Facility: CLINIC | Age: 44
End: 2024-08-12
Payer: MEDICARE

## 2024-09-03 ENCOUNTER — PATIENT MESSAGE (OUTPATIENT)
Dept: BARIATRICS | Facility: CLINIC | Age: 44
End: 2024-09-03
Payer: MEDICARE

## 2024-09-10 ENCOUNTER — PATIENT MESSAGE (OUTPATIENT)
Dept: BARIATRICS | Facility: CLINIC | Age: 44
End: 2024-09-10
Payer: MEDICARE

## 2024-10-01 ENCOUNTER — PATIENT MESSAGE (OUTPATIENT)
Dept: BARIATRICS | Facility: CLINIC | Age: 44
End: 2024-10-01
Payer: MEDICARE

## 2024-10-08 ENCOUNTER — PATIENT MESSAGE (OUTPATIENT)
Dept: BARIATRICS | Facility: CLINIC | Age: 44
End: 2024-10-08
Payer: MEDICARE

## 2024-11-02 ENCOUNTER — PATIENT MESSAGE (OUTPATIENT)
Dept: BARIATRICS | Facility: CLINIC | Age: 44
End: 2024-11-02
Payer: MEDICARE

## 2024-11-12 ENCOUNTER — PATIENT MESSAGE (OUTPATIENT)
Dept: BARIATRICS | Facility: CLINIC | Age: 44
End: 2024-11-12
Payer: MEDICARE

## 2024-12-03 ENCOUNTER — PATIENT MESSAGE (OUTPATIENT)
Dept: BARIATRICS | Facility: CLINIC | Age: 44
End: 2024-12-03
Payer: MEDICARE

## 2024-12-10 ENCOUNTER — PATIENT MESSAGE (OUTPATIENT)
Dept: BARIATRICS | Facility: CLINIC | Age: 44
End: 2024-12-10
Payer: MEDICARE

## 2025-01-06 ENCOUNTER — PATIENT MESSAGE (OUTPATIENT)
Dept: BARIATRICS | Facility: CLINIC | Age: 45
End: 2025-01-06
Payer: MEDICARE

## 2025-01-14 ENCOUNTER — PATIENT MESSAGE (OUTPATIENT)
Dept: BARIATRICS | Facility: CLINIC | Age: 45
End: 2025-01-14
Payer: MEDICARE

## 2025-02-10 ENCOUNTER — PATIENT MESSAGE (OUTPATIENT)
Dept: BARIATRICS | Facility: CLINIC | Age: 45
End: 2025-02-10
Payer: MEDICARE

## 2025-02-26 ENCOUNTER — PATIENT MESSAGE (OUTPATIENT)
Dept: BARIATRICS | Facility: CLINIC | Age: 45
End: 2025-02-26
Payer: MEDICARE

## 2025-03-10 ENCOUNTER — PATIENT MESSAGE (OUTPATIENT)
Dept: BARIATRICS | Facility: CLINIC | Age: 45
End: 2025-03-10
Payer: MEDICARE

## 2025-04-08 ENCOUNTER — PATIENT MESSAGE (OUTPATIENT)
Dept: BARIATRICS | Facility: CLINIC | Age: 45
End: 2025-04-08
Payer: MEDICARE

## 2025-05-13 ENCOUNTER — PATIENT MESSAGE (OUTPATIENT)
Dept: BARIATRICS | Facility: CLINIC | Age: 45
End: 2025-05-13
Payer: MEDICARE

## 2025-06-03 ENCOUNTER — PATIENT MESSAGE (OUTPATIENT)
Dept: BARIATRICS | Facility: CLINIC | Age: 45
End: 2025-06-03
Payer: MEDICARE

## 2025-07-07 ENCOUNTER — PATIENT MESSAGE (OUTPATIENT)
Dept: BARIATRICS | Facility: CLINIC | Age: 45
End: 2025-07-07
Payer: MEDICARE

## 2025-07-08 ENCOUNTER — PATIENT MESSAGE (OUTPATIENT)
Dept: BARIATRICS | Facility: CLINIC | Age: 45
End: 2025-07-08
Payer: MEDICARE

## 2025-08-12 ENCOUNTER — PATIENT MESSAGE (OUTPATIENT)
Dept: BARIATRICS | Facility: CLINIC | Age: 45
End: 2025-08-12
Payer: MEDICARE

## (undated) DEVICE — CANNULA ENDOPATH XCEL 5X100MM

## (undated) DEVICE — NDL HYPO REG 25G X 1 1/2

## (undated) DEVICE — ADHESIVE MASTISOL VIAL 48/BX

## (undated) DEVICE — SHEARS HARMONIC 5CM 36CM

## (undated) DEVICE — SUT MCRYL PLUS 4-0 PS2 27IN

## (undated) DEVICE — TROCAR ENDOPATH XCEL 12X100MM

## (undated) DEVICE — CLOSURE SKIN STERI STRIP 1/2X4

## (undated) DEVICE — DRAPE ABDOMINAL TIBURON 14X11

## (undated) DEVICE — RELOAD ECHELON FLEX GRN 60MM

## (undated) DEVICE — SUT 0 VICRYL / UR6 (J603)

## (undated) DEVICE — SEE MEDLINE ITEM 156902

## (undated) DEVICE — TUBING HF INSUFFLATION W/ FLTR

## (undated) DEVICE — RELOAD ECHELON FLEX BLU 60MM

## (undated) DEVICE — STAPLER ECHELON FLEX 60MM 44CM

## (undated) DEVICE — SYR 10CC LUER LOCK

## (undated) DEVICE — TROCAR ENDOPATH XCEL 12MM 10CM

## (undated) DEVICE — ELECTRODE REM PLYHSV RETURN 9

## (undated) DEVICE — SEE MEDLINE ITEM 152487

## (undated) DEVICE — TROCAR ENDOPATH XCEL 5X100MM